# Patient Record
Sex: FEMALE | Race: BLACK OR AFRICAN AMERICAN | NOT HISPANIC OR LATINO | URBAN - METROPOLITAN AREA
[De-identification: names, ages, dates, MRNs, and addresses within clinical notes are randomized per-mention and may not be internally consistent; named-entity substitution may affect disease eponyms.]

---

## 2016-12-30 NOTE — PATIENT PROFILE ADULT. - LIVES WITH, PROFILE
(2 children, brothers, and mother), private home, 4 steps-to-enter/exit home w/o handrail, bedroom/bathroom on first floor of home/children/other relative/parents

## 2016-12-30 NOTE — H&P ADULT. - ASSESSMENT
26-year-old female with Mobitz I, reported prior history of 2 recent episodes of pericarditis (both at [facility]) presenting with 2days of nausea/vomiting and abdominal pain.

## 2016-12-30 NOTE — H&P ADULT. - PROBLEM SELECTOR PLAN 4
unclear etiology of pericarditis  obtain records from [place]  Lyme titer from 12/12/16 negative, however started on CTX by ED for "lyme disease" ?presumed endocarditis   patient with only 1/2 IgM and 2/5 IgG not consistent with positive Lyme titers as stated in EMR unclear etiology of pericarditis  obtain records from [place]  Lyme titer from 12/12/16 negative, however started on CTX by ED for "lyme disease" ?presumed endocarditis   patient with only 1/2 IgM and 2/5 IgG not consistent with positive Lyme titers, Western blot the more reliable test for Lyme testing, patient likely does not have Lyme exposure, will d/c antibiotics.

## 2016-12-30 NOTE — ED PROVIDER NOTE - ATTENDING CONTRIBUTION TO CARE
agree with resident note  26 yr old female with recent visit to ED/CDU for chest pain and noted to have pericarditis and well in CDU had second degree type I.  Lyme titers were sent and have returned positive.  Today she presents with mild abdominal pain with associated V/D.  Denies sick contacts.  Given titer returned positive recently has not been on abx.  Another important complaint for her as well as diffuse myalgias and arthralgias.    PE:  uncomfortable, VSS, CTAB/L; s1 s2 no m/r/g abd soft/NT/ND ext: no edema skin: no evidence of tick

## 2016-12-30 NOTE — ED ADULT NURSE NOTE - OBJECTIVE STATEMENT
Patient received in room #27 c/o N/V/D starting last night. A&Ox3, ambulatory. Patient reports she has been vomiting "all day", diarrhea x1 today. Patient c/o lower abdominal pain. Denies any chest pain or difficulty breathing. Patient reports last menstrual period ended 12/14/16. Patient denies any pain or difficulty urinating. VS as noted. 20G IV Placed in right ac, labs drawn and sent. Will monitor.

## 2016-12-30 NOTE — ED PROVIDER NOTE - MEDICAL DECISION MAKING DETAILS
Pt 25 yo F PMHx of anemia and Pericarditis who p/w n/v and one episode of diarrhea concerning for gastroenteritis but will r/o pregnancy and pancreatitis. Will order CBC, CMP, Lipase, UA, UCx, VBG w/ lactate, IVFs, Morphine, zofran, Urine preg and Serum Beta-hcg.

## 2016-12-30 NOTE — ED PROVIDER NOTE - PROGRESS NOTE DETAILS
patient found to have lyme disease w/ pericarditis and 2nd degree Mobitz on last CDU stay will need admission for disseminated lyme disease. No abdominal CT scans needed at this time for gastroenteritis. Spoke to hospitalist and will admit on Tele to Medicine. Text Paged MAR.

## 2016-12-30 NOTE — H&P ADULT. - PROBLEM SELECTOR PLAN 1
Patient between 1st and 2nd degree type 1 heart block, unclear etiology, possibly related to recent pericarditis Patient between 1st and 2nd degree type 1 heart block, unclear etiology, possibly related to recent pericarditis  monitor on tele  call cards consult given recent syncopal episode however more likely a result of vagal stimulation from repetitive vomiting and decreased PO intake Patient between 1st and 2nd degree type 1 heart block, unclear etiology, possibly related to recent pericarditis however no signs of pericarditis at present  monitor on tele  call cards consult given recent syncopal episode however more likely a result of vagal stimulation from repetitive vomiting and decreased PO intake  echo from 12/11/16 without signs of pericardial effusion or pericarditis, nml EF 66%  given injected L sclera, will check inflammatory markers, ESR/CRP  no significant hilar adenopathy on CXR to suggest sarcoid, however in differential  may benefit from rheum eval if workup negative (will check UA to look for protein in urine, may suggest vasculitic/systemic involvement)

## 2016-12-30 NOTE — H&P ADULT. - LAB RESULTS AND INTERPRETATION
Labs personally reviewed  CMP/CBC wnl, negative hCG, lipase wnl  Lyme titers from 12/11/16 reviewed - negative (only one out 2 needed IgM positive (41KD) and only 2 out of needed 5 IgG positive) however noted heart block while in CDU on prior ED stay Labs personally reviewed  CMP/CBC wnl, negative hCG, lipase wnl  Lyme titers from 12/11/16 reviewed - negative (only one out 2 needed IgM positive (41KD) and only 2 out of needed 5 IgG positive) however noted heart block while in CDU on prior ED stay  CK/troponin (-)

## 2016-12-30 NOTE — ED PROVIDER NOTE - OBJECTIVE STATEMENT
Pt 25 yo F h/o pericarditis and anemia who pw severe abdominal pain that started yesterday and night then this morning she started having severe vomiting. Pt states she had blood in her vomit as well that was streaky the first time she vomited. Pt had one episode of diarrhea when it started but none since. Pt describes pain as burning, non radiating, diffuse abdominal pain. Nothing has made it better and she continues to vomit. Pt denies sick contacts, travel, LMP 12/14/16, and no recent abx use.

## 2016-12-30 NOTE — H&P ADULT. - HISTORY OF PRESENT ILLNESS
26-year-old female with history of Mobitz I,     In the ED VS: 97.9  74  114/79  12  100%RA, received Ceftriaxone 2g IV x1, Morphine 4mg IV x1, Zofran 4mg IV x1, 2L NS. 26-year-old female with history of Mobitz I (seen on prior CDU visit 12/11/16) presenting with     In the ED VS: 97.9  74  114/79  12  100%RA, received Ceftriaxone 2g IV x1, Morphine 4mg IV x1, Zofran 4mg IV x1, 2L NS. 26-year-old female with history of Mobitz I (seen on prior CDU visit 12/11/16) presenting with nausea/vomiting/abdominal pain x2 days.  She reports at least 4 episodes of vomiting/day.  Noted blood in first episode of vomitus 2 days ago, has not noted any blood since that time.  She has associated chills, myalgias, headache and anorexia. Noted decreased frequency of urination.  She denies sick contacts, travel, recent antibiotic use, fevers, chest pain, palpitations, cough, shortness of breath, rhinorrhea, sinus symptoms, dysuria, hematuria. She reports recently going to [facility] 10/2016 for pericarditis, was discharged home with motrin, unclear etiology of pericarditis.  Patient also presented to [facility] ED 12/11/16 and monitored in CDU noted to have 2nd degree type 1 and also noted type 2 in CDU note however no EKG evidence of Mobitz 2.  Patient has no personal or family history of autoimmune diseases including sarcoid, amyloid.  Patient reports "blacking out" at home yesterday onto her bed, with no head trauma. No recent camping trips, lives in [place], no known tick bites.  Reports one of her dogs at home had fleas and they were biting her repeatedly prior to her first episode of pericarditis in 10/2016.    In the ED VS: 97.9  74  114/79  12  100%RA, received Ceftriaxone 2g IV x1, Morphine 4mg IV x1, Zofran 4mg IV x1, 2L NS. 26-year-old female with history of Mobitz I (seen on prior CDU visit 12/11/16) presenting with nausea/vomiting/abdominal pain x2 days.  She reports at least 4 episodes of vomiting/day.  Noted blood in first episode of vomitus 2 days ago, has not noted any blood since that time.  She has associated chills, myalgias, headache and anorexia. Noted decreased frequency of urination.  She denies sick contacts, travel, recent antibiotic use, rashes/skin changes, fevers, chest pain, palpitations, cough, shortness of breath, rhinorrhea, sinus symptoms, dysuria, hematuria. She reports recently going to [facility] 10/2016 for pericarditis, was discharged home with motrin, unclear etiology of pericarditis.  Patient also presented to [facility] ED 12/11/16 and monitored in CDU noted to have 2nd degree type 1 and also noted type 2 in CDU note however no EKG evidence of Mobitz 2.  Patient has no personal or family history of autoimmune diseases including sarcoid, amyloid.  Patient reports "blacking out" at home yesterday onto her bed, with no head trauma. No recent camping trips, lives in [place], no known tick bites.  Reports one of her dogs at home had fleas and they were biting her repeatedly prior to her first episode of pericarditis in 10/2016.    Patient reports monogamous sexual contact with boyfriend, no history of STI.     In the ED VS: 97.9  74  114/79  12  100%RA, received Ceftriaxone 2g IV x1, Morphine 4mg IV x1, Zofran 4mg IV x1, 2L NS.

## 2016-12-30 NOTE — H&P ADULT. - RS GEN PE MLT RESP DETAILS PC
breath sounds equal/clear to auscultation bilaterally/no rhonchi/no wheezes/no intercostal retractions/normal/respirations non-labored/airway patent/good air movement/no rales

## 2016-12-30 NOTE — H&P ADULT. - PROBLEM SELECTOR PLAN 3
Patient with second degree type I AV block and seemingly asymptomatic however with the vomiting - possible syncope in response to, in a sense, a vagal maneuver, will treat with anti-emetics, IVF, monitor on tele, check orthostatics

## 2016-12-30 NOTE — H&P ADULT. - PROBLEM SELECTOR PLAN 2
anti-emetics for nausea, IVF while not tolerating PO  lipase wnl  avoid NSAIDs for now in setting of report of small amt blood noted in vomitus 2days ago x1 (with first episode of vomiting)  serial abdominal exams   abd pain diffuse, likely musculoskeletal in origin given recurrent vomiting   likely viral etiology anti-emetics for nausea, IVF while not tolerating PO  lipase wnl  avoid NSAIDs for now in setting of report of small amt blood noted in vomitus 2days ago x1 (with first episode of vomiting)  serial abdominal exams, if persistent consider CT abd for further eval  abd pain diffuse, likely musculoskeletal in origin given recurrent vomiting   likely viral etiology

## 2016-12-31 NOTE — DISCHARGE NOTE ADULT - PLAN OF CARE
Continue with supportive care, hydration and a healthy diet Follow up with your PCP within 2 weeks of discharge You continued to have heart block during your hospitalization Cardiology and EP doctors evaluated you and recommended____________ There was no evidence to suggest acute Pericarditis during this hospitalization It is likely that your passed out (syncope) after vomiting and retching at home Continue with supportive care. If you feel tired, faint, or weak, try sitting down to protect from falls. If this continues to happen speak with your doctor - this may be due to your history of AV block (heart block) You had a negative titer for Lyme disease Cardiology and EP doctors evaluated you and recommended no acute interventions at this time. You should follow up as an outpatient with an EP doctor. The phone number to schedule an appointment is [phone].

## 2016-12-31 NOTE — DISCHARGE NOTE ADULT - SECONDARY DIAGNOSIS.
Mobitz (type) I (Wenckebach's) atrioventricular block History of pericarditis Syncope and collapse Lyme disease

## 2016-12-31 NOTE — DISCHARGE NOTE ADULT - PATIENT PORTAL LINK FT
“You can access the [facility] Patient Portal, offered by [facility], by registering with the following website: [email or web address]”

## 2016-12-31 NOTE — DISCHARGE NOTE ADULT - HOSPITAL COURSE
26-year-old female with history of Mobitz I (seen on prior CDU visit 12/11/16) presenting with nausea/vomiting/abdominal pain x2 days.  She reports at least 4 episodes of vomiting/day.  Noted blood in first episode of vomitus 2 days ago, has not noted any blood since that time.  She has associated chills, myalgias, headache and anorexia. Noted decreased frequency of urination.  She denies sick contacts, travel, recent antibiotic use, rashes/skin changes, fevers, chest pain, palpitations, cough, shortness of breath, rhinorrhea, sinus symptoms, dysuria, hematuria. She reports recently going to [facility] 10/2016 for pericarditis, was discharged home with motrin, unclear etiology of pericarditis.  Patient also presented to [facility] ED 12/11/16 and monitored in CDU noted to have 2nd degree type 1 and also noted type 2 in CDU note however no EKG evidence of Mobitz 2.  Patient has no personal or family history of autoimmune diseases including sarcoid, amyloid.  Patient reports "blacking out" at home yesterday onto her bed, with no head trauma. No recent camping trips, lives in [place], no known tick bites.  Reports one of her dogs at home had fleas and they were biting her repeatedly prior to her first episode of pericarditis in 10/2016. Patient reports monogamous sexual contact with boyfriend, no history of STI.     In the ED VS: 97.9  74  114/79  12  100%RA, received Ceftriaxone 2g IV x1, Morphine 4mg IV x1, Zofran 4mg IV x1, 2L NS    Hospital Course: Patient was monitored on telemetry, cardiology was consulted and EP was consulted. Cardiology and EP recommended___________________________________________ Patient's lab work was stable with no sign of leukocytosis, infection, kidney damage, or dehydration. Due to suspicion of Lyme, ID was consulted - they recommended _____________________________. Patient telemetry showed consistent evidence of Mobitz I heart block. Patient remained asymptomatic, with stable vital signs. Patient was initially monitored on a clear liquid diet, however, upon tolerating an advanced diet was stable for close outpatient follow up. 26-year-old female with history of Mobitz I (seen on prior CDU visit 12/11/16) presenting with nausea/vomiting/abdominal pain x2 days.  She reports at least 4 episodes of vomiting/day.  Noted blood in first episode of vomitus 2 days ago, has not noted any blood since that time.  She has associated chills, myalgias, headache and anorexia. Noted decreased frequency of urination.  She denies sick contacts, travel, recent antibiotic use, rashes/skin changes, fevers, chest pain, palpitations, cough, shortness of breath, rhinorrhea, sinus symptoms, dysuria, hematuria. She reports recently going to [facility] 10/2016 for pericarditis, was discharged home with motrin, unclear etiology of pericarditis.  Patient also presented to [facility] ED 12/11/16 and monitored in CDU noted to have 2nd degree type 1 and also noted type 2 in CDU note however no EKG evidence of Mobitz 2.  Patient has no personal or family history of autoimmune diseases including sarcoid, amyloid.  Patient reports "blacking out" at home yesterday onto her bed, with no head trauma. No recent camping trips, lives in [place], no known tick bites.  Reports one of her dogs at home had fleas and they were biting her repeatedly prior to her first episode of pericarditis in 10/2016. Patient reports monogamous sexual contact with boyfriend, no history of STI.     In the ED VS: 97.9  74  114/79  12  100%RA, received Ceftriaxone 2g IV x1, Morphine 4mg IV x1, Zofran 4mg IV x1, 2L NS    Hospital Course: Patient was monitored on telemetry, cardiology was consulted and EP was consulted. Cardiology and EP recommended no acute intervention Patient's lab work was stable with no sign of leukocytosis, infection, kidney damage, or dehydration. Due to suspicion of Lyme, ID was consulted - they recommended no antibiotics, titers were negative. Patient telemetry showed consistent evidence of Mobitz I heart block. Patient remained asymptomatic, with stable vital signs. Patient was initially monitored on a clear liquid diet, however, upon tolerating an advanced diet was stable for close outpatient follow up. 26-year-old female with history of Mobitz I (seen on prior CDU visit 12/11/16) presenting with nausea/vomiting/abdominal pain x2 days.  She reports at least 4 episodes of vomiting/day.  Noted blood in first episode of vomitus 2 days ago, has not noted any blood since that time.  She has associated chills, myalgias, headache and anorexia. Noted decreased frequency of urination.  She denies sick contacts, travel, recent antibiotic use, rashes/skin changes, fevers, chest pain, palpitations, cough, shortness of breath, rhinorrhea, sinus symptoms, dysuria, hematuria. She reports recently going to [facility] 10/2016 for pericarditis, was discharged home with motrin, unclear etiology of pericarditis.  Patient also presented to [facility] ED 12/11/16 and monitored in CDU noted to have 2nd degree type 1 and also noted type 2 in CDU note however no EKG evidence of Mobitz 2.  Patient has no personal or family history of autoimmune diseases including sarcoid, amyloid.  Patient reports "blacking out" at home yesterday onto her bed, with no head trauma. No recent camping trips, lives in [place], no known tick bites.  Reports one of her dogs at home had fleas and they were biting her repeatedly prior to her first episode of pericarditis in 10/2016. Patient reports monogamous sexual contact with boyfriend, no history of STI.     In the ED VS: 97.9  74  114/79  12  100%RA, received Ceftriaxone 2g IV x1, Morphine 4mg IV x1, Zofran 4mg IV x1, 2L NS    Hospital Course: Patient was monitored on telemetry, cardiology was consulted and EP was consulted. Cardiology and EP recommended no acute intervention Patient's lab work was stable with no sign of leukocytosis, infection, kidney damage, or dehydration. Due to suspicion of Lyme, ID was consulted - they recommended no antibiotics, titers were negative. Patient telemetry showed consistent evidence of Mobitz I heart block. Patient remained asymptomatic, with stable vital signs. Patient was initially monitored on a clear liquid diet, however, upon tolerating an advanced diet was stable for close outpatient follow up. She is to follow up with EP as an outpatient and return to the ED should she have any further chest pain, SOB, palpitations, dizziness, LH, or syncope.

## 2016-12-31 NOTE — DISCHARGE NOTE ADULT - CARE PLAN
Principal Discharge DX:	Gastroenteritis  Goal:	Continue with supportive care, hydration and a healthy diet  Instructions for follow-up, activity and diet:	Follow up with your PCP within 2 weeks of discharge  Secondary Diagnosis:	Mobitz (type) I (Wenckebach's) atrioventricular block  Goal:	You continued to have heart block during your hospitalization  Instructions for follow-up, activity and diet:	Cardiology and EP doctors evaluated you and recommended____________  Secondary Diagnosis:	History of pericarditis  Goal:	There was no evidence to suggest acute Pericarditis during this hospitalization  Secondary Diagnosis:	Syncope and collapse  Goal:	It is likely that your passed out (syncope) after vomiting and retching at home  Instructions for follow-up, activity and diet:	Continue with supportive care. If you feel tired, faint, or weak, try sitting down to protect from falls. If this continues to happen speak with your doctor - this may be due to your history of AV block (heart block)  Secondary Diagnosis:	Lyme disease Principal Discharge DX:	Gastroenteritis  Goal:	Continue with supportive care, hydration and a healthy diet  Instructions for follow-up, activity and diet:	Follow up with your PCP within 2 weeks of discharge  Secondary Diagnosis:	Mobitz (type) I (Wenckebach's) atrioventricular block  Goal:	You continued to have heart block during your hospitalization  Instructions for follow-up, activity and diet:	Cardiology and EP doctors evaluated you and recommended no acute interventions at this time. You should follow up as an outpatient with an EP doctor. The phone number to schedule an appointment is [phone].  Secondary Diagnosis:	History of pericarditis  Goal:	There was no evidence to suggest acute Pericarditis during this hospitalization  Secondary Diagnosis:	Syncope and collapse  Goal:	It is likely that your passed out (syncope) after vomiting and retching at home  Instructions for follow-up, activity and diet:	Continue with supportive care. If you feel tired, faint, or weak, try sitting down to protect from falls. If this continues to happen speak with your doctor - this may be due to your history of AV block (heart block)  Secondary Diagnosis:	Lyme disease  Goal:	You had a negative titer for Lyme disease Principal Discharge DX:	Gastroenteritis  Goal:	Continue with supportive care, hydration and a healthy diet  Instructions for follow-up, activity and diet:	Follow up with your PCP within 2 weeks of discharge  Secondary Diagnosis:	Mobitz (type) I (Wenckebach's) atrioventricular block  Goal:	You continued to have heart block during your hospitalization  Instructions for follow-up, activity and diet:	Cardiology and EP doctors evaluated you and recommended no acute interventions at this time. You should follow up as an outpatient with an EP doctor. The phone number to schedule an appointment is [phone].  Secondary Diagnosis:	History of pericarditis  Goal:	There was no evidence to suggest acute Pericarditis during this hospitalization  Secondary Diagnosis:	Syncope and collapse  Goal:	It is likely that your passed out (syncope) after vomiting and retching at home  Instructions for follow-up, activity and diet:	Continue with supportive care. If you feel tired, faint, or weak, try sitting down to protect from falls. If this continues to happen speak with your doctor - this may be due to your history of AV block (heart block)  Secondary Diagnosis:	Lyme disease  Goal:	You had a negative titer for Lyme disease Principal Discharge DX:	Gastroenteritis  Goal:	Continue with supportive care, hydration and a healthy diet  Instructions for follow-up, activity and diet:	Follow up with your PCP within 2 weeks of discharge  Secondary Diagnosis:	Mobitz (type) I (Wenckebach's) atrioventricular block  Goal:	You continued to have heart block during your hospitalization  Instructions for follow-up, activity and diet:	Cardiology and EP doctors evaluated you and recommended no acute interventions at this time. You should follow up as an outpatient with an EP doctor. The phone number to schedule an appointment is [phone].  Secondary Diagnosis:	History of pericarditis  Goal:	There was no evidence to suggest acute Pericarditis during this hospitalization  Secondary Diagnosis:	Syncope and collapse  Goal:	It is likely that your passed out (syncope) after vomiting and retching at home  Instructions for follow-up, activity and diet:	Continue with supportive care. If you feel tired, faint, or weak, try sitting down to protect from falls. If this continues to happen speak with your doctor - this may be due to your history of AV block (heart block)  Secondary Diagnosis:	Lyme disease  Goal:	You had a negative titer for Lyme disease Principal Discharge DX:	Gastroenteritis  Goal:	Continue with supportive care, hydration and a healthy diet  Instructions for follow-up, activity and diet:	Follow up with your PCP within 2 weeks of discharge  Secondary Diagnosis:	Mobitz (type) I (Wenckebach's) atrioventricular block  Goal:	You continued to have heart block during your hospitalization  Instructions for follow-up, activity and diet:	Cardiology and EP doctors evaluated you and recommended no acute interventions at this time. You should follow up as an outpatient with an EP doctor. The phone number to schedule an appointment is [phone].  Secondary Diagnosis:	History of pericarditis  Goal:	There was no evidence to suggest acute Pericarditis during this hospitalization  Secondary Diagnosis:	Syncope and collapse  Goal:	It is likely that your passed out (syncope) after vomiting and retching at home  Instructions for follow-up, activity and diet:	Continue with supportive care. If you feel tired, faint, or weak, try sitting down to protect from falls. If this continues to happen speak with your doctor - this may be due to your history of AV block (heart block)  Secondary Diagnosis:	Lyme disease  Goal:	You had a negative titer for Lyme disease Principal Discharge DX:	Gastroenteritis  Goal:	Continue with supportive care, hydration and a healthy diet  Instructions for follow-up, activity and diet:	Follow up with your PCP within 2 weeks of discharge  Secondary Diagnosis:	Mobitz (type) I (Wenckebach's) atrioventricular block  Goal:	You continued to have heart block during your hospitalization  Instructions for follow-up, activity and diet:	Cardiology and EP doctors evaluated you and recommended no acute interventions at this time. You should follow up as an outpatient with an EP doctor. The phone number to schedule an appointment is [phone].  Secondary Diagnosis:	History of pericarditis  Goal:	There was no evidence to suggest acute Pericarditis during this hospitalization  Secondary Diagnosis:	Syncope and collapse  Goal:	It is likely that your passed out (syncope) after vomiting and retching at home  Instructions for follow-up, activity and diet:	Continue with supportive care. If you feel tired, faint, or weak, try sitting down to protect from falls. If this continues to happen speak with your doctor - this may be due to your history of AV block (heart block)  Secondary Diagnosis:	Lyme disease  Goal:	You had a negative titer for Lyme disease

## 2017-01-02 NOTE — PHYSICAL THERAPY INITIAL EVALUATION ADULT - CRITERIA FOR SKILLED THERAPEUTIC INTERVENTIONS
anticipated discharge recommendation/impairments found/therapy frequency/predicted duration of therapy intervention/Outpatient PT

## 2017-01-02 NOTE — PHYSICAL THERAPY INITIAL EVALUATION ADULT - RANGE OF MOTION EXAMINATION, REHAB EVAL
bilateral upper extremity ROM was WFL (within functional limits)/bilateral lower extremity ROM was WFL (within functional limits)/(not formally assessed)

## 2017-01-02 NOTE — PHYSICAL THERAPY INITIAL EVALUATION ADULT - ADDITIONAL COMMENTS
Pt. left sitting @ EOB post-PT in NAD w/ all lines/tubes intact & table/phone/call bell within reach.  RN Jarocho aware.

## 2017-01-02 NOTE — PHYSICAL THERAPY INITIAL EVALUATION ADULT - LIVES WITH, PROFILE
children/(2 children, brothers, and mother), private home, 4 steps-to-enter/exit home w/o handrail, bedroom/bathroom on first floor of home/other relative/parents

## 2017-01-02 NOTE — PHYSICAL THERAPY INITIAL EVALUATION ADULT - PERTINENT HX OF CURRENT PROBLEM, REHAB EVAL
Pt. is a 27 y/o female adm. to [facility] on 12/30/16 w/ a dx of Lyme Disease, nausea, vomiting, and abdominal pain.  PT consult request 2/2 feels unstable on feet.  H/O pericarditis.  (-) CXR.  CE (-) x 1.

## 2017-01-12 ENCOUNTER — EMERGENCY (EMERGENCY)
Facility: HOSPITAL | Age: 27
LOS: 1 days | Discharge: HOME | End: 2017-01-12
Attending: EMERGENCY MEDICINE | Admitting: EMERGENCY MEDICINE
Payer: MEDICAID

## 2017-01-12 VITALS
TEMPERATURE: 98 F | RESPIRATION RATE: 18 BRPM | HEART RATE: 80 BPM | SYSTOLIC BLOOD PRESSURE: 111 MMHG | DIASTOLIC BLOOD PRESSURE: 68 MMHG | OXYGEN SATURATION: 100 %

## 2017-01-12 VITALS
RESPIRATION RATE: 15 BRPM | DIASTOLIC BLOOD PRESSURE: 90 MMHG | SYSTOLIC BLOOD PRESSURE: 134 MMHG | OXYGEN SATURATION: 100 % | HEART RATE: 68 BPM | TEMPERATURE: 98 F

## 2017-01-12 DIAGNOSIS — Z98.891 HISTORY OF UTERINE SCAR FROM PREVIOUS SURGERY: Chronic | ICD-10-CM

## 2017-01-12 LAB
ALBUMIN SERPL ELPH-MCNC: 3.7 G/DL — SIGNIFICANT CHANGE UP (ref 3.3–5)
ALP SERPL-CCNC: 65 U/L — SIGNIFICANT CHANGE UP (ref 40–120)
ALT FLD-CCNC: 30 U/L — SIGNIFICANT CHANGE UP (ref 4–33)
AST SERPL-CCNC: 24 U/L — SIGNIFICANT CHANGE UP (ref 4–32)
BASOPHILS # BLD AUTO: 0.03 K/UL — SIGNIFICANT CHANGE UP (ref 0–0.2)
BASOPHILS NFR BLD AUTO: 0.6 % — SIGNIFICANT CHANGE UP (ref 0–2)
BILIRUB SERPL-MCNC: 0.2 MG/DL — SIGNIFICANT CHANGE UP (ref 0.2–1.2)
BUN SERPL-MCNC: 12 MG/DL — SIGNIFICANT CHANGE UP (ref 7–23)
CALCIUM SERPL-MCNC: 9.3 MG/DL — SIGNIFICANT CHANGE UP (ref 8.4–10.5)
CHLORIDE SERPL-SCNC: 102 MMOL/L — SIGNIFICANT CHANGE UP (ref 98–107)
CO2 SERPL-SCNC: 24 MMOL/L — SIGNIFICANT CHANGE UP (ref 22–31)
CREAT SERPL-MCNC: 0.87 MG/DL — SIGNIFICANT CHANGE UP (ref 0.5–1.3)
D DIMER BLD IA.RAPID-MCNC: 172 NG/ML — SIGNIFICANT CHANGE UP
EOSINOPHIL # BLD AUTO: 0.07 K/UL — SIGNIFICANT CHANGE UP (ref 0–0.5)
EOSINOPHIL NFR BLD AUTO: 1.4 % — SIGNIFICANT CHANGE UP (ref 0–6)
GLUCOSE SERPL-MCNC: 77 MG/DL — SIGNIFICANT CHANGE UP (ref 70–99)
HCG SERPL-ACNC: < 5 MIU/ML — SIGNIFICANT CHANGE UP
HCT VFR BLD CALC: 35.3 % — SIGNIFICANT CHANGE UP (ref 34.5–45)
HGB BLD-MCNC: 11.1 G/DL — LOW (ref 11.5–15.5)
IMM GRANULOCYTES NFR BLD AUTO: 0.2 % — SIGNIFICANT CHANGE UP (ref 0–1.5)
LYMPHOCYTES # BLD AUTO: 2.4 K/UL — SIGNIFICANT CHANGE UP (ref 1–3.3)
LYMPHOCYTES # BLD AUTO: 48.1 % — HIGH (ref 13–44)
MCHC RBC-ENTMCNC: 27.8 PG — SIGNIFICANT CHANGE UP (ref 27–34)
MCHC RBC-ENTMCNC: 31.4 % — LOW (ref 32–36)
MCV RBC AUTO: 88.5 FL — SIGNIFICANT CHANGE UP (ref 80–100)
MONOCYTES # BLD AUTO: 0.38 K/UL — SIGNIFICANT CHANGE UP (ref 0–0.9)
MONOCYTES NFR BLD AUTO: 7.6 % — SIGNIFICANT CHANGE UP (ref 2–14)
NEUTROPHILS # BLD AUTO: 2.1 K/UL — SIGNIFICANT CHANGE UP (ref 1.8–7.4)
NEUTROPHILS NFR BLD AUTO: 42.1 % — LOW (ref 43–77)
PLATELET # BLD AUTO: 241 K/UL — SIGNIFICANT CHANGE UP (ref 150–400)
PMV BLD: 10.8 FL — SIGNIFICANT CHANGE UP (ref 7–13)
POTASSIUM SERPL-MCNC: 4.1 MMOL/L — SIGNIFICANT CHANGE UP (ref 3.5–5.3)
POTASSIUM SERPL-SCNC: 4.1 MMOL/L — SIGNIFICANT CHANGE UP (ref 3.5–5.3)
PROT SERPL-MCNC: 6.9 G/DL — SIGNIFICANT CHANGE UP (ref 6–8.3)
RBC # BLD: 3.99 M/UL — SIGNIFICANT CHANGE UP (ref 3.8–5.2)
RBC # FLD: 13.5 % — SIGNIFICANT CHANGE UP (ref 10.3–14.5)
SODIUM SERPL-SCNC: 139 MMOL/L — SIGNIFICANT CHANGE UP (ref 135–145)
TROPONIN T SERPL-MCNC: < 0.06 NG/ML — SIGNIFICANT CHANGE UP (ref 0–0.06)
TROPONIN T SERPL-MCNC: < 0.06 NG/ML — SIGNIFICANT CHANGE UP (ref 0–0.06)
WBC # BLD: 4.99 K/UL — SIGNIFICANT CHANGE UP (ref 3.8–10.5)
WBC # FLD AUTO: 4.99 K/UL — SIGNIFICANT CHANGE UP (ref 3.8–10.5)

## 2017-01-12 PROCEDURE — 71020: CPT | Mod: 26

## 2017-01-12 PROCEDURE — 93010 ELECTROCARDIOGRAM REPORT: CPT

## 2017-01-12 PROCEDURE — 99285 EMERGENCY DEPT VISIT HI MDM: CPT | Mod: 25

## 2017-01-12 RX ORDER — FAMOTIDINE 10 MG/ML
1 INJECTION INTRAVENOUS
Qty: 30 | Refills: 0 | OUTPATIENT
Start: 2017-01-12 | End: 2017-02-11

## 2017-01-12 RX ORDER — IBUPROFEN 200 MG
1 TABLET ORAL
Qty: 42 | Refills: 0 | OUTPATIENT
Start: 2017-01-12 | End: 2017-01-26

## 2017-01-12 RX ORDER — SODIUM CHLORIDE 9 MG/ML
1000 INJECTION INTRAMUSCULAR; INTRAVENOUS; SUBCUTANEOUS ONCE
Qty: 0 | Refills: 0 | Status: COMPLETED | OUTPATIENT
Start: 2017-01-12 | End: 2017-01-12

## 2017-01-12 RX ORDER — ACETAMINOPHEN 500 MG
650 TABLET ORAL ONCE
Qty: 0 | Refills: 0 | Status: COMPLETED | OUTPATIENT
Start: 2017-01-12 | End: 2017-01-12

## 2017-01-12 RX ORDER — IPRATROPIUM/ALBUTEROL SULFATE 18-103MCG
3 AEROSOL WITH ADAPTER (GRAM) INHALATION ONCE
Qty: 0 | Refills: 0 | Status: COMPLETED | OUTPATIENT
Start: 2017-01-12 | End: 2017-01-12

## 2017-01-12 RX ORDER — IBUPROFEN 200 MG
400 TABLET ORAL ONCE
Qty: 0 | Refills: 0 | Status: DISCONTINUED | OUTPATIENT
Start: 2017-01-12 | End: 2017-01-12

## 2017-01-12 RX ORDER — IBUPROFEN 200 MG
600 TABLET ORAL ONCE
Qty: 0 | Refills: 0 | Status: COMPLETED | OUTPATIENT
Start: 2017-01-12 | End: 2017-01-12

## 2017-01-12 RX ADMIN — SODIUM CHLORIDE 1000 MILLILITER(S): 9 INJECTION INTRAMUSCULAR; INTRAVENOUS; SUBCUTANEOUS at 18:31

## 2017-01-12 RX ADMIN — Medication 600 MILLIGRAM(S): at 18:32

## 2017-01-12 RX ADMIN — Medication 650 MILLIGRAM(S): at 19:28

## 2017-01-12 RX ADMIN — Medication 3 MILLILITER(S): at 18:32

## 2017-01-12 RX ADMIN — Medication 600 MILLIGRAM(S): at 19:11

## 2017-01-12 NOTE — ED PROVIDER NOTE - ATTENDING CONTRIBUTION TO CARE
I was physically present for the E/M service provided. I agree with above history, physical, and plan which I have reviewed and edited where appropriate. I was physically present for the key portions of the service provided.    25 y/o female with PMH of pericarditis and 1st degree AV block p/w CP, non-pleuritic, non-exertional, improved leaning forward  -EKG 1st degree block without ischemia  -Troponin WNL x2  -CXR clear  -No pericardial effusion on US  -?pericarditis, NSAIDs and PCP + cards f/u

## 2017-01-12 NOTE — ED ADULT NURSE NOTE - OBJECTIVE STATEMENT
stabbing in nature, constant x 2 days, without shortness of breath and dizziness.  Pt recently d/xed pericarditis couple months ago.   Seen by MD Velázquez.  IV accessed.  labs sent.    EKG - NSR.  Cardiac monitoring ongoing.  Medication given as ordered.   Pending results and further disposition.

## 2017-01-12 NOTE — ED PROVIDER NOTE - PLAN OF CARE
-- Please follow up with a cardiologist within a week. Call [phone] to schedule an appointment ASAP. Most patients can be seen within 48 hours. Mention that you were just discharged from the emergency room and need to be seen immediately.   -- See referral list to make follow up appointment with rheumatology in 5-7 days.  -- Take ibuprofen as instructed. Your prescription is waiting for you at [facility], [address] [facility] in [place]. Take ibuprofen with meals. If you experience stomach pain or other adverse effect after taking ibuprofen, stop taking it and discuss further with your doctor.  -- Return to ER immediately for new or worsening symptoms or for any concerns. -- Please follow up with a cardiologist within a week. Call [phone] to schedule an appointment ASAP. Most patients can be seen within 48 hours. Mention that you were just discharged from the emergency room and need to be seen immediately.   -- See referral list to make follow up appointment with rheumatology in 5-7 days.  -- Take ibuprofen and pepcid as instructed. Your prescription is waiting for you at [facility], [address] [place] [address] in [place]. Take ibuprofen with meals. If you experience stomach pain or other adverse effect after taking ibuprofen, stop taking it and discuss further with your doctor.  -- Return to ER immediately for new or worsening symptoms or for any concerns.

## 2017-01-12 NOTE — ED PROVIDER NOTE - OBJECTIVE STATEMENT
27yo female h/o pericarditis 1st degree AV block, recent admission for ?disseminated Lyme (Per ID not lyme), 1st degree vs 2nd degree AV block, seen by cards and no intervention recommended, p/w sharp substernal chest pain worsening over the last few days associated with SOB, fatigue, 27yo female h/o pericarditis 1st degree AV block, recent admission for ?disseminated Lyme (Per ID not lyme), 1st degree vs 2nd degree AV block, seen by cards and no intervention recommended, (normal echo) p/w sharp substernal chest pain worsening over the last few days associated with SOB, fatigue, joint pains. No fevers, chills, URI sx, cough Pt says for last few weeks, decreased PO, no energy to get out of bed

## 2017-01-12 NOTE — ED PROVIDER NOTE - CARE PLAN
Principal Discharge DX:	Chest pain  Instructions for follow-up, activity and diet:	-- Please follow up with a cardiologist within a week. Call [phone] to schedule an appointment ASAP. Most patients can be seen within 48 hours. Mention that you were just discharged from the emergency room and need to be seen immediately.   -- See referral list to make follow up appointment with rheumatology in 5-7 days.  -- Take ibuprofen as instructed. Your prescription is waiting for you at [facility], [address] [facility] in [place]. Take ibuprofen with meals. If you experience stomach pain or other adverse effect after taking ibuprofen, stop taking it and discuss further with your doctor.  -- Return to ER immediately for new or worsening symptoms or for any concerns. Principal Discharge DX:	Chest pain  Instructions for follow-up, activity and diet:	-- Please follow up with a cardiologist within a week. Call [phone] to schedule an appointment ASAP. Most patients can be seen within 48 hours. Mention that you were just discharged from the emergency room and need to be seen immediately.   -- See referral list to make follow up appointment with rheumatology in 5-7 days.  -- Take ibuprofen and pepcid as instructed. Your prescription is waiting for you at [facility], [address] [facility] in [place]. Take ibuprofen with meals. If you experience stomach pain or other adverse effect after taking ibuprofen, stop taking it and discuss further with your doctor.  -- Return to ER immediately for new or worsening symptoms or for any concerns. Principal Discharge DX:	Chest pain  Instructions for follow-up, activity and diet:	-- Please follow up with a cardiologist within a week. Call [phone] to schedule an appointment ASAP. Most patients can be seen within 48 hours. Mention that you were just discharged from the emergency room and need to be seen immediately.   -- See referral list to make follow up appointment with rheumatology in 5-7 days.  -- Take ibuprofen and pepcid as instructed. Your prescription is waiting for you at [facility], [address] [address] [facility] in [place]. Take ibuprofen with meals. If you experience stomach pain or other adverse effect after taking ibuprofen, stop taking it and discuss further with your doctor.  -- Return to ER immediately for new or worsening symptoms or for any concerns. Principal Discharge DX:	Chest pain  Instructions for follow-up, activity and diet:	-- Please follow up with a cardiologist within a week. Call [phone] to schedule an appointment ASAP. Most patients can be seen within 48 hours. Mention that you were just discharged from the emergency room and need to be seen immediately.   -- See referral list to make follow up appointment with rheumatology in 5-7 days.  -- Take ibuprofen and pepcid as instructed. Your prescription is waiting for you at [facility], [address] [address] [facility] in [place]. Take ibuprofen with meals. If you experience stomach pain or other adverse effect after taking ibuprofen, stop taking it and discuss further with your doctor.  -- Return to ER immediately for new or worsening symptoms or for any concerns.

## 2017-01-12 NOTE — ED ADULT TRIAGE NOTE - CHIEF COMPLAINT QUOTE
c/o chest pressure x4 days with dizziness, feels worse today , 2  asa given by ems  PMH- first degree block, pacemaker candidate- pt reports she  hasn't followed up with cardiologist for a few months

## 2017-01-12 NOTE — ED PROVIDER NOTE - PROGRESS NOTE DETAILS
Gollogly: Pt signed out to me. Pt resting comfortably, NAD, HD stable, A&O x3. Discussed plan from second trop, if normal then d/c with outpt cardiology/rheum follow up. Spoke with pt's PMD Mary Grace Winslow. Dr Winslow aware pt seen in ED for pericarditis with planned cardiology follow up.

## 2017-01-12 NOTE — ED PROVIDER NOTE - MEDICAL DECISION MAKING DETAILS
25yo female with sharp substernal chest pain, not consistent with ACS, will check labs, ekg, cxr, echo, reassess

## 2017-10-22 ENCOUNTER — INPATIENT (INPATIENT)
Facility: HOSPITAL | Age: 27
LOS: 3 days | Discharge: HOME | End: 2017-10-26
Attending: HOSPITALIST | Admitting: HOSPITALIST
Payer: MEDICAID

## 2017-10-22 VITALS
OXYGEN SATURATION: 100 % | RESPIRATION RATE: 16 BRPM | HEART RATE: 73 BPM | SYSTOLIC BLOOD PRESSURE: 127 MMHG | DIASTOLIC BLOOD PRESSURE: 84 MMHG | TEMPERATURE: 98 F

## 2017-10-22 DIAGNOSIS — Z98.891 HISTORY OF UTERINE SCAR FROM PREVIOUS SURGERY: Chronic | ICD-10-CM

## 2017-10-22 LAB
ALBUMIN SERPL ELPH-MCNC: 3.8 G/DL — SIGNIFICANT CHANGE UP (ref 3.3–5)
ALP SERPL-CCNC: 66 U/L — SIGNIFICANT CHANGE UP (ref 40–120)
ALT FLD-CCNC: 23 U/L — SIGNIFICANT CHANGE UP (ref 4–33)
APTT BLD: 29.1 SEC — SIGNIFICANT CHANGE UP (ref 27.5–37.4)
AST SERPL-CCNC: 18 U/L — SIGNIFICANT CHANGE UP (ref 4–32)
BASE EXCESS BLDV CALC-SCNC: 4.2 MMOL/L — SIGNIFICANT CHANGE UP
BASOPHILS # BLD AUTO: 0.04 K/UL — SIGNIFICANT CHANGE UP (ref 0–0.2)
BASOPHILS NFR BLD AUTO: 0.7 % — SIGNIFICANT CHANGE UP (ref 0–2)
BILIRUB SERPL-MCNC: 0.2 MG/DL — SIGNIFICANT CHANGE UP (ref 0.2–1.2)
BLOOD GAS VENOUS - CREATININE: 0.95 MG/DL — SIGNIFICANT CHANGE UP (ref 0.5–1.3)
BUN SERPL-MCNC: 14 MG/DL — SIGNIFICANT CHANGE UP (ref 7–23)
CALCIUM SERPL-MCNC: 8.9 MG/DL — SIGNIFICANT CHANGE UP (ref 8.4–10.5)
CHLORIDE BLDV-SCNC: 105 MMOL/L — SIGNIFICANT CHANGE UP (ref 96–108)
CHLORIDE SERPL-SCNC: 103 MMOL/L — SIGNIFICANT CHANGE UP (ref 98–107)
CK MB BLD-MCNC: 1 NG/ML — SIGNIFICANT CHANGE UP (ref 1–4.7)
CK MB BLD-MCNC: 1 NG/ML — SIGNIFICANT CHANGE UP (ref 1–4.7)
CK MB BLD-MCNC: SIGNIFICANT CHANGE UP (ref 0–2.5)
CK MB BLD-MCNC: SIGNIFICANT CHANGE UP (ref 0–2.5)
CK SERPL-CCNC: 63 U/L — SIGNIFICANT CHANGE UP (ref 25–170)
CK SERPL-CCNC: 66 U/L — SIGNIFICANT CHANGE UP (ref 25–170)
CO2 SERPL-SCNC: 25 MMOL/L — SIGNIFICANT CHANGE UP (ref 22–31)
CREAT SERPL-MCNC: 0.86 MG/DL — SIGNIFICANT CHANGE UP (ref 0.5–1.3)
D DIMER BLD IA.RAPID-MCNC: 272 NG/ML — SIGNIFICANT CHANGE UP
EOSINOPHIL # BLD AUTO: 0.07 K/UL — SIGNIFICANT CHANGE UP (ref 0–0.5)
EOSINOPHIL NFR BLD AUTO: 1.2 % — SIGNIFICANT CHANGE UP (ref 0–6)
GAS PNL BLDV: 135 MMOL/L — LOW (ref 136–146)
GLUCOSE BLDV-MCNC: 79 — SIGNIFICANT CHANGE UP (ref 70–99)
GLUCOSE SERPL-MCNC: 78 MG/DL — SIGNIFICANT CHANGE UP (ref 70–99)
HBA1C BLD-MCNC: 5.3 % — SIGNIFICANT CHANGE UP (ref 4–5.6)
HCG SERPL-ACNC: < 5 MIU/ML — SIGNIFICANT CHANGE UP
HCO3 BLDV-SCNC: 27 MMOL/L — SIGNIFICANT CHANGE UP (ref 20–27)
HCT VFR BLD CALC: 34.6 % — SIGNIFICANT CHANGE UP (ref 34.5–45)
HCT VFR BLDV CALC: 34.8 % — SIGNIFICANT CHANGE UP (ref 34.5–45)
HGB BLD-MCNC: 11 G/DL — LOW (ref 11.5–15.5)
HGB BLDV-MCNC: 11.3 G/DL — LOW (ref 11.5–15.5)
IMM GRANULOCYTES # BLD AUTO: 0.01 # — SIGNIFICANT CHANGE UP
IMM GRANULOCYTES NFR BLD AUTO: 0.2 % — SIGNIFICANT CHANGE UP (ref 0–1.5)
INR BLD: 1.02 — SIGNIFICANT CHANGE UP (ref 0.88–1.17)
LACTATE BLDV-MCNC: 0.7 MMOL/L — SIGNIFICANT CHANGE UP (ref 0.5–2)
LIDOCAIN IGE QN: 20 U/L — SIGNIFICANT CHANGE UP (ref 7–60)
LYMPHOCYTES # BLD AUTO: 2.56 K/UL — SIGNIFICANT CHANGE UP (ref 1–3.3)
LYMPHOCYTES # BLD AUTO: 42.5 % — SIGNIFICANT CHANGE UP (ref 13–44)
MCHC RBC-ENTMCNC: 28.6 PG — SIGNIFICANT CHANGE UP (ref 27–34)
MCHC RBC-ENTMCNC: 31.8 % — LOW (ref 32–36)
MCV RBC AUTO: 90.1 FL — SIGNIFICANT CHANGE UP (ref 80–100)
MONOCYTES # BLD AUTO: 0.48 K/UL — SIGNIFICANT CHANGE UP (ref 0–0.9)
MONOCYTES NFR BLD AUTO: 8 % — SIGNIFICANT CHANGE UP (ref 2–14)
NEUTROPHILS # BLD AUTO: 2.87 K/UL — SIGNIFICANT CHANGE UP (ref 1.8–7.4)
NEUTROPHILS NFR BLD AUTO: 47.4 % — SIGNIFICANT CHANGE UP (ref 43–77)
NRBC # FLD: 0 — SIGNIFICANT CHANGE UP
PCO2 BLDV: 50 MMHG — SIGNIFICANT CHANGE UP (ref 41–51)
PH BLDV: 7.38 PH — SIGNIFICANT CHANGE UP (ref 7.32–7.43)
PLATELET # BLD AUTO: 234 K/UL — SIGNIFICANT CHANGE UP (ref 150–400)
PMV BLD: 10.2 FL — SIGNIFICANT CHANGE UP (ref 7–13)
PO2 BLDV: 26 MMHG — LOW (ref 35–40)
POTASSIUM BLDV-SCNC: 4 MMOL/L — SIGNIFICANT CHANGE UP (ref 3.4–4.5)
POTASSIUM SERPL-MCNC: 4.4 MMOL/L — SIGNIFICANT CHANGE UP (ref 3.5–5.3)
POTASSIUM SERPL-SCNC: 4.4 MMOL/L — SIGNIFICANT CHANGE UP (ref 3.5–5.3)
PROT SERPL-MCNC: 6.8 G/DL — SIGNIFICANT CHANGE UP (ref 6–8.3)
PROTHROM AB SERPL-ACNC: 11.4 SEC — SIGNIFICANT CHANGE UP (ref 9.8–13.1)
RBC # BLD: 3.84 M/UL — SIGNIFICANT CHANGE UP (ref 3.8–5.2)
RBC # FLD: 13.1 % — SIGNIFICANT CHANGE UP (ref 10.3–14.5)
SAO2 % BLDV: 43.4 % — LOW (ref 60–85)
SODIUM SERPL-SCNC: 140 MMOL/L — SIGNIFICANT CHANGE UP (ref 135–145)
TROPONIN T SERPL-MCNC: < 0.06 NG/ML — SIGNIFICANT CHANGE UP (ref 0–0.06)
TROPONIN T SERPL-MCNC: < 0.06 NG/ML — SIGNIFICANT CHANGE UP (ref 0–0.06)
WBC # BLD: 6.03 K/UL — SIGNIFICANT CHANGE UP (ref 3.8–10.5)
WBC # FLD AUTO: 6.03 K/UL — SIGNIFICANT CHANGE UP (ref 3.8–10.5)

## 2017-10-22 PROCEDURE — 71020: CPT | Mod: 26

## 2017-10-22 PROCEDURE — 71275 CT ANGIOGRAPHY CHEST: CPT | Mod: 26

## 2017-10-22 RX ORDER — ACETAMINOPHEN 500 MG
1000 TABLET ORAL ONCE
Qty: 0 | Refills: 0 | Status: COMPLETED | OUTPATIENT
Start: 2017-10-22 | End: 2017-10-22

## 2017-10-22 RX ORDER — ONDANSETRON 8 MG/1
4 TABLET, FILM COATED ORAL ONCE
Qty: 0 | Refills: 0 | Status: COMPLETED | OUTPATIENT
Start: 2017-10-22 | End: 2017-10-22

## 2017-10-22 RX ORDER — METOCLOPRAMIDE HCL 10 MG
10 TABLET ORAL ONCE
Qty: 0 | Refills: 0 | Status: COMPLETED | OUTPATIENT
Start: 2017-10-22 | End: 2017-10-22

## 2017-10-22 RX ORDER — SODIUM CHLORIDE 9 MG/ML
1000 INJECTION INTRAMUSCULAR; INTRAVENOUS; SUBCUTANEOUS ONCE
Qty: 0 | Refills: 0 | Status: COMPLETED | OUTPATIENT
Start: 2017-10-22 | End: 2017-10-22

## 2017-10-22 RX ORDER — ACYCLOVIR SODIUM 500 MG
800 VIAL (EA) INTRAVENOUS THREE TIMES A DAY
Qty: 0 | Refills: 0 | Status: DISCONTINUED | OUTPATIENT
Start: 2017-10-22 | End: 2017-10-24

## 2017-10-22 RX ORDER — FAMOTIDINE 10 MG/ML
20 INJECTION INTRAVENOUS ONCE
Qty: 0 | Refills: 0 | Status: COMPLETED | OUTPATIENT
Start: 2017-10-22 | End: 2017-10-22

## 2017-10-22 RX ORDER — KETOROLAC TROMETHAMINE 30 MG/ML
15 SYRINGE (ML) INJECTION ONCE
Qty: 0 | Refills: 0 | Status: DISCONTINUED | OUTPATIENT
Start: 2017-10-22 | End: 2017-10-22

## 2017-10-22 RX ADMIN — ONDANSETRON 4 MILLIGRAM(S): 8 TABLET, FILM COATED ORAL at 10:31

## 2017-10-22 RX ADMIN — Medication 15 MILLIGRAM(S): at 12:21

## 2017-10-22 RX ADMIN — Medication 400 MILLIGRAM(S): at 10:31

## 2017-10-22 RX ADMIN — Medication 1000 MILLIGRAM(S): at 11:01

## 2017-10-22 RX ADMIN — Medication 15 MILLIGRAM(S): at 11:51

## 2017-10-22 RX ADMIN — FAMOTIDINE 20 MILLIGRAM(S): 10 INJECTION INTRAVENOUS at 10:31

## 2017-10-22 RX ADMIN — Medication 10 MILLIGRAM(S): at 13:03

## 2017-10-22 RX ADMIN — Medication 800 MILLIGRAM(S): at 22:59

## 2017-10-22 RX ADMIN — ONDANSETRON 4 MILLIGRAM(S): 8 TABLET, FILM COATED ORAL at 11:33

## 2017-10-22 RX ADMIN — SODIUM CHLORIDE 1000 MILLILITER(S): 9 INJECTION INTRAMUSCULAR; INTRAVENOUS; SUBCUTANEOUS at 10:44

## 2017-10-22 NOTE — ED CDU PROVIDER DISPOSITION NOTE - CLINICAL COURSE
see cdu note and progress note asp t is being admitted for second degree ehart block type 1, HR in 40's, seen by cards np
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
## 2017-10-22 NOTE — ED ADULT TRIAGE NOTE - CHIEF COMPLAINT QUOTE
Pt evaluated at [facility] and DX with [place] Palsy and started on medication.  Pt f/u with PMD and instructed to continue current medication regimen.  Pt endorses CP and vomiting. Pt presents for another opinion.

## 2017-10-22 NOTE — ED ADULT NURSE NOTE - OBJECTIVE STATEMENT
PT is AxOx4, ambulates, c/o chest pain, nausea and headache, recently diagnosed with BEll's Palsy.  PMH of Pericarditis, medicated with Tylenol IV for pain, children by the pts's bed. EKG done, Tely monitor in progress, continue to monitor.

## 2017-10-22 NOTE — ED CDU PROVIDER INITIAL DAY NOTE - PROGRESS NOTE DETAILS
CDU JOSH QURESHI: pt examined at bedside, denies any current chest pain, sob, abdominal pain, I cdu for cex2, + echo, however pt HR keeps dropping to 40-42-46, has no cardiologist, will call house cards for a consult and follow their A/P and reasses CDU JOSH QURESHI: I spoke with cards no, they want pt admitted to hospitalist and they will follow pt in-house and see pt, I spoke with dr. rothman who accepted the patient

## 2017-10-22 NOTE — ED CDU PROVIDER INITIAL DAY NOTE - CHPI ED SYMPTOMS NEG
no cough/no vomiting/no fever/No abdominal pain, no leg edema, no pain, no rashes, no other complaints

## 2017-10-22 NOTE — ED ADULT NURSE NOTE - CHIEF COMPLAINT QUOTE
Pt evaluated at [facility] and DX with [place] Palsy and started on medication.  Pt f/u with PMD and instructed to continue current medication regimen.  Pt endorses CP and vomiting. Pt presents for another opinion.

## 2017-10-22 NOTE — ED PROVIDER NOTE - PROGRESS NOTE DETAILS
Pt's children picked up by a male family member. Pt reports persistent chest pain and continued R sided facial pressure despite tx. D-dimer added. CXR and Reglan ordered. Will reassess. Pt reports R facial and intermittent but improved L chest pain. Repeat EKG showed 2nd degree Mobitz I block with 2 dropped beats. Pt's dc summary from 12/2016 visit describes similar event on tele monitoring for which no acute intervention was recommended by Cards/EP at that time. CTA reveals no PE. Trop neg x 2. Plan tele monitoring, pain control. Echo in am ro pericardial effusion/evidence of pericarditis. Pt aware and agree with plan.

## 2017-10-22 NOTE — ED CDU PROVIDER DISPOSITION NOTE - ATTENDING CONTRIBUTION TO CARE
CDU MD DUTTON:  I performed a face to face bedside interview with patient regarding history of present illness, review of symptoms and past medical history. I completed an independent physical exam.  I have discussed patient's plan of care with PA.   I agree with note as stated above, having amended the EMR as needed to reflect my findings. I have discussed the assessment and plan of care.  This includes during the time I functioned as the attending physician for this patient.

## 2017-10-22 NOTE — ED PROVIDER NOTE - MEDICAL DECISION MAKING DETAILS
Recent bell's dx p/w r sided HA and chronic intermittent L sided CP, EKG as noted. Plan - cardiac monitor, basic labs including troponin, sx control, reassess, will perform Chest XR once pt's Mother arrives in the ER. Recent bell's dx p/w r sided HA and chronic intermittent L sided CP, EKG as noted. Plan - cardiac monitor, basic labs including troponin x 2, sx control, reassess, will perform Chest XR once pt's Mother arrives in the ER.

## 2017-10-22 NOTE — ED CDU PROVIDER INITIAL DAY NOTE - ATTENDING CONTRIBUTION TO CARE
CDU MD DUTTON:  I performed a face to face bedside interview with patient regarding history of present illness, review of symptoms and past medical history. I completed an independent physical exam.  I have discussed patient's plan of care with PA.   I agree with note as stated above, having amended the EMR as needed to reflect my findings. I have discussed the assessment and plan of care.  This includes during the time I functioned as the attending physician for this patient.    26 y/o female with h/o pericarditis in past year with chronic chest pain since that time here for eval of cp.  Chest pain is central non-rad, similar to chronic cp in past.  Recent dx of bells, on steroids and antivirals with good resolution of right facial droop.  Additional c/o ha, n/v since starting bells meds.  Denies f/c, ha, neck stiffness, sob, cough, abd pain, dysuria, rash.  Afebrile, vs wnl, nad, mild right facial droop, ctabil, s1s2 rrr no m/r/g, abd soft non ttp no r/g, no cva tenderness b/l, no leg swelling b/l, no rash.  During stay in ED, had two sets of neg ce's, ctpa neg for pe and ekg which demonstrated new second degree mobitz type 1 heart block.  Sent to CDU for continued tele monitoring, echo to eval for effusion or other structural abnormality.

## 2017-10-22 NOTE — ED CDU PROVIDER INITIAL DAY NOTE - OBJECTIVE STATEMENT
26 y/o F w/ PMHx of Pericarditis dx 2016, 1st degree AV block, prior negative workup for Lyme dz, recently dx w/ Bell's Palsy x6days ago, on "Prednisone 60 QD, acyclovir 800 TID, famotidine 20mg BID" presents to ED w/ multiple complaints. Pt reports R sided HA since this AM, described as throbbing, retroorbital and temporal. Pt also reports CP since 8:00AM today and SOB since 2:00AM today. Rates pain 9/10. Pt has had chronic intermittent L sided CP since her dx of pericarditis. States pain is unchanged from prior. Pt also reports decreased PO intake secondary to nausea and vomiting x3days. No abdominal pain, no leg edema, no pain, no rashes, no other complaints. Pt returned from [place] 2017. Followed up w/ her PMD, Dr. Winslow( [phone]) yesterday who advised her to continue current Pitts's tx if she can tolerate GI symptoms. LMP x1wk ago. No smoking, EtOH use, or drug use. Past surgical h/o . Of note pt is accompanied by 2 young children, and advised that an adult needs to come in and watch children while pt is getting ER workup. Pt states her mother will come.  ER Note Above: JOSH Padron 26 y/o female with a hx of pericarditis, 1st degree AV block presents to the ER c/o Chest pain and shortness of breath since this AM.  Pt currently being tx for Right sided Bell's Palsy.  Pt reports nausea and vomiting for the past 3 days improved.  Pt reports right sided facial pain and headache. Pt denies fevers, chills, recent illness.  Pt does not have a cardiologist.

## 2017-10-22 NOTE — ED PROVIDER NOTE - OBJECTIVE STATEMENT
26 y/o F w/ PMHx of Pericarditis dx 2016, 1st degree AV block, prior negative workup for Lime dz, recently dx w/ Bell's Palsy x6days ago, on "Prednisone 60 DLE, acyclovir 800 TID, famotidine 20mg BID" presents to ED w/ multiple complaints. Pt reports R sided HA since this AM, described as throbbing, retroorbital and temporal. Pt also reports CP since 8:00AM today and SOB since 2:00AM today. Rates pain 9/10. Pt has had chronic intermittent L sided CP since her dx of pericarditis. States pain is unchanged from prior. Pt also reports decreased PO intake secondary to nausea and vomiting x3days. No abdominal pain, no leg edema, no pain, no rashes, no other complaints. Pt returned from [place] 2017 and now filed w/ her PMD, Dr. Winslow( [phone]). Pt saw Dr. Winslow yesterday who advised her to current Bell's tx. LMP x1wk ago. No smoking, EtOH use, or drug use. Past surgical h/o . Of note pt is accompanied by 2 young children, and advised that an adult needs to come in and watch children while pt is getting ER workup. Pt states her mother will come. 28 y/o F w/ PMHx of Pericarditis dx 2016, 1st degree AV block, prior negative workup for Lyme dz, recently dx w/ Bell's Palsy x6days ago, on "Prednisone 60 QD, acyclovir 800 TID, famotidine 20mg BID" presents to ED w/ multiple complaints. Pt reports R sided HA since this AM, described as throbbing, retroorbital and temporal. Pt also reports CP since 8:00AM today and SOB since 2:00AM today. Rates pain 9/10. Pt has had chronic intermittent L sided CP since her dx of pericarditis. States pain is unchanged from prior. Pt also reports decreased PO intake secondary to nausea and vomiting x3days. No abdominal pain, no leg edema, no pain, no rashes, no other complaints. Pt returned from [place] 2017. Followed up w/ her PMD, Dr. Winslow( [phone]) yesterday who advised her to continue current Pitts's tx if she can tolerate GI symptoms. LMP x1wk ago. No smoking, EtOH use, or drug use. Past surgical h/o . Of note pt is accompanied by 2 young children, and advised that an adult needs to come in and watch children while pt is getting ER workup. Pt states her mother will come.

## 2017-10-23 DIAGNOSIS — G51.0 BELL'S PALSY: ICD-10-CM

## 2017-10-23 DIAGNOSIS — R07.81 PLEURODYNIA: ICD-10-CM

## 2017-10-23 DIAGNOSIS — Z29.9 ENCOUNTER FOR PROPHYLACTIC MEASURES, UNSPECIFIED: ICD-10-CM

## 2017-10-23 DIAGNOSIS — I44.1 ATRIOVENTRICULAR BLOCK, SECOND DEGREE: ICD-10-CM

## 2017-10-23 LAB
ALBUMIN SERPL ELPH-MCNC: 3.5 G/DL — SIGNIFICANT CHANGE UP (ref 3.3–5)
ALP SERPL-CCNC: 62 U/L — SIGNIFICANT CHANGE UP (ref 40–120)
ALT FLD-CCNC: 18 U/L — SIGNIFICANT CHANGE UP (ref 4–33)
AST SERPL-CCNC: 14 U/L — SIGNIFICANT CHANGE UP (ref 4–32)
BASOPHILS # BLD AUTO: 0.04 K/UL — SIGNIFICANT CHANGE UP (ref 0–0.2)
BASOPHILS NFR BLD AUTO: 0.7 % — SIGNIFICANT CHANGE UP (ref 0–2)
BILIRUB SERPL-MCNC: 0.4 MG/DL — SIGNIFICANT CHANGE UP (ref 0.2–1.2)
BUN SERPL-MCNC: 12 MG/DL — SIGNIFICANT CHANGE UP (ref 7–23)
CALCIUM SERPL-MCNC: 8.9 MG/DL — SIGNIFICANT CHANGE UP (ref 8.4–10.5)
CHLORIDE SERPL-SCNC: 102 MMOL/L — SIGNIFICANT CHANGE UP (ref 98–107)
CO2 SERPL-SCNC: 25 MMOL/L — SIGNIFICANT CHANGE UP (ref 22–31)
CREAT SERPL-MCNC: 1.05 MG/DL — SIGNIFICANT CHANGE UP (ref 0.5–1.3)
CRP SERPL-MCNC: 3.8 MG/L — SIGNIFICANT CHANGE UP
EOSINOPHIL # BLD AUTO: 0.11 K/UL — SIGNIFICANT CHANGE UP (ref 0–0.5)
EOSINOPHIL NFR BLD AUTO: 2 % — SIGNIFICANT CHANGE UP (ref 0–6)
ERYTHROCYTE [SEDIMENTATION RATE] IN BLOOD: 23 MM/HR — SIGNIFICANT CHANGE UP (ref 4–25)
GLUCOSE SERPL-MCNC: 80 MG/DL — SIGNIFICANT CHANGE UP (ref 70–99)
HCT VFR BLD CALC: 32.1 % — LOW (ref 34.5–45)
HGB BLD-MCNC: 10.3 G/DL — LOW (ref 11.5–15.5)
HIV 1+2 AB+HIV1 P24 AG SERPL QL IA: SIGNIFICANT CHANGE UP
IMM GRANULOCYTES # BLD AUTO: 0.02 # — SIGNIFICANT CHANGE UP
IMM GRANULOCYTES NFR BLD AUTO: 0.4 % — SIGNIFICANT CHANGE UP (ref 0–1.5)
LYMPHOCYTES # BLD AUTO: 2.81 K/UL — SIGNIFICANT CHANGE UP (ref 1–3.3)
LYMPHOCYTES # BLD AUTO: 49.9 % — HIGH (ref 13–44)
MAGNESIUM SERPL-MCNC: 1.9 MG/DL — SIGNIFICANT CHANGE UP (ref 1.6–2.6)
MCHC RBC-ENTMCNC: 28.1 PG — SIGNIFICANT CHANGE UP (ref 27–34)
MCHC RBC-ENTMCNC: 32.1 % — SIGNIFICANT CHANGE UP (ref 32–36)
MCV RBC AUTO: 87.5 FL — SIGNIFICANT CHANGE UP (ref 80–100)
MONOCYTES # BLD AUTO: 0.48 K/UL — SIGNIFICANT CHANGE UP (ref 0–0.9)
MONOCYTES NFR BLD AUTO: 8.5 % — SIGNIFICANT CHANGE UP (ref 2–14)
NEUTROPHILS # BLD AUTO: 2.17 K/UL — SIGNIFICANT CHANGE UP (ref 1.8–7.4)
NEUTROPHILS NFR BLD AUTO: 38.5 % — LOW (ref 43–77)
NRBC # FLD: 0 — SIGNIFICANT CHANGE UP
PHOSPHATE SERPL-MCNC: 3.9 MG/DL — SIGNIFICANT CHANGE UP (ref 2.5–4.5)
PLATELET # BLD AUTO: 211 K/UL — SIGNIFICANT CHANGE UP (ref 150–400)
PMV BLD: 10.4 FL — SIGNIFICANT CHANGE UP (ref 7–13)
POTASSIUM SERPL-MCNC: 4.4 MMOL/L — SIGNIFICANT CHANGE UP (ref 3.5–5.3)
POTASSIUM SERPL-SCNC: 4.4 MMOL/L — SIGNIFICANT CHANGE UP (ref 3.5–5.3)
PROT SERPL-MCNC: 6.3 G/DL — SIGNIFICANT CHANGE UP (ref 6–8.3)
RBC # BLD: 3.67 M/UL — LOW (ref 3.8–5.2)
RBC # FLD: 13.2 % — SIGNIFICANT CHANGE UP (ref 10.3–14.5)
SODIUM SERPL-SCNC: 139 MMOL/L — SIGNIFICANT CHANGE UP (ref 135–145)
TSH SERPL-MCNC: 0.89 UIU/ML — SIGNIFICANT CHANGE UP (ref 0.27–4.2)
WBC # BLD: 5.63 K/UL — SIGNIFICANT CHANGE UP (ref 3.8–10.5)
WBC # FLD AUTO: 5.63 K/UL — SIGNIFICANT CHANGE UP (ref 3.8–10.5)

## 2017-10-23 PROCEDURE — 99223 1ST HOSP IP/OBS HIGH 75: CPT | Mod: GC

## 2017-10-23 PROCEDURE — 12345: CPT | Mod: NC

## 2017-10-23 PROCEDURE — 70450 CT HEAD/BRAIN W/O DYE: CPT | Mod: 26

## 2017-10-23 PROCEDURE — 93306 TTE W/DOPPLER COMPLETE: CPT | Mod: 26

## 2017-10-23 PROCEDURE — 99222 1ST HOSP IP/OBS MODERATE 55: CPT

## 2017-10-23 PROCEDURE — 99222 1ST HOSP IP/OBS MODERATE 55: CPT | Mod: GC

## 2017-10-23 RX ORDER — IBUPROFEN 200 MG
400 TABLET ORAL EVERY 6 HOURS
Qty: 0 | Refills: 0 | Status: DISCONTINUED | OUTPATIENT
Start: 2017-10-23 | End: 2017-10-26

## 2017-10-23 RX ORDER — ONDANSETRON 8 MG/1
6 TABLET, FILM COATED ORAL EVERY 8 HOURS
Qty: 0 | Refills: 0 | Status: DISCONTINUED | OUTPATIENT
Start: 2017-10-23 | End: 2017-10-26

## 2017-10-23 RX ORDER — MECLIZINE HCL 12.5 MG
12.5 TABLET ORAL THREE TIMES A DAY
Qty: 0 | Refills: 0 | Status: DISCONTINUED | OUTPATIENT
Start: 2017-10-23 | End: 2017-10-26

## 2017-10-23 RX ADMIN — Medication 400 MILLIGRAM(S): at 08:45

## 2017-10-23 RX ADMIN — Medication 800 MILLIGRAM(S): at 05:42

## 2017-10-23 RX ADMIN — Medication 800 MILLIGRAM(S): at 13:11

## 2017-10-23 RX ADMIN — Medication 800 MILLIGRAM(S): at 22:17

## 2017-10-23 RX ADMIN — Medication 400 MILLIGRAM(S): at 07:30

## 2017-10-23 NOTE — H&P ADULT - NSHPLABSRESULTS_GEN_ALL_CORE
.  LABS:                         11.0   6.03  )-----------( 234      ( 22 Oct 2017 10:22 )             34.6     10-22    140  |  103  |  14  ----------------------------<  78  4.4   |  25  |  0.86    Ca    8.9      22 Oct 2017 10:20    TPro  6.8  /  Alb  3.8  /  TBili  0.2  /  DBili  x   /  AST  18  /  ALT  23  /  AlkPhos  66  10-22    PT/INR - ( 22 Oct 2017 10:20 )   PT: 11.4 SEC;   INR: 1.02          PTT - ( 22 Oct 2017 10:20 )  PTT:29.1 SEC          RADIOLOGY, EKG & ADDITIONAL TESTS: Reviewed.     EKG: first EKG (10/22) with first degree AV block. 2nd EKG (10/22) with mobitz type I AV block

## 2017-10-23 NOTE — CONSULT NOTE ADULT - ATTENDING COMMENTS
Echo reviewed, normal LV and no effusion. I'm not convinced this is pericarditis. Monitor tele x 24 hours for new Type I 2nd degree HB, which is likely vagally mediated.
Bell's palsy in 26 yo woman with h/o pericarditis and heart block 2016.  Doubt related to Lyme; does not have epidemiologic risk.  Would check Lyme ALLIE (testing) with confirmatory Western blot if positive or equivocal.  Check HIV, QuatiferonTB gold, syphilis serology.  Would look for other etiology for heart block and cranial nerve palsy including non infectious causes such as sarcoid.

## 2017-10-23 NOTE — H&P ADULT - PROBLEM SELECTOR PLAN 3
-c/w home prednisone and acyclovir  -antiemetic PRN -c/w home acyclovir  -antiemetic PRN  -ibuprofen PRN for pain

## 2017-10-23 NOTE — H&P ADULT - ATTENDING COMMENTS
Pt resting comfortably.  Chest pain improved without other new symptoms.  Tele monitor reviewed presently showing sinus rhythm with 1st deg AV block with occasional Mobitz I 2nd degree block.  Currently asymptomatic.  Serial EKG's reviewed showing 1st degree and Mobitz 1 AV block.    Will f/u further cardiology recs  continue to monitor on tele.

## 2017-10-23 NOTE — CONSULT NOTE ADULT - ASSESSMENT
28 y/o F w/ PMHx of Pericarditis dx 2016, Mobitz type I AV block, prior negative workup for Lyme dz (2016), recently dx w/ Bell's Palsy 1 wk ago on prednisone 60mg Qd and acyclovir 800mg TID presents a/w pleuritic chest pain and Mobitz type I AV block on EKG

## 2017-10-23 NOTE — H&P ADULT - NSHPREVIEWOFSYSTEMS_GEN_ALL_CORE
REVIEW OF SYSTEMS:  CONSTITUTIONAL: No weight loss, or fatigue  EYES: No eye pain, visual disturbances, or discharge  ENMT:  No difficulty hearing, tinnitus, vertigo; No sinus or throat pain  NECK: No pain or stiffness  RESPIRATORY: No cough, wheezing, chills or hemoptysis; No shortness of breath  CARDIOVASCULAR: No palpitations, dizziness, or leg swelling, +CP  GASTROINTESTINAL: No abdominal or epigastric pain. No hematemesis; No diarrhea or constipation. +n/v  GENITOURINARY: No dysuria, frequency, hematuria, or incontinence  NEUROLOGICAL: memory loss, loss of strength, numbness, or tremors + headache  SKIN: No itching, burning, rashes, or lesions   LYMPH NODES: No enlarged glands  ENDOCRINE: No heat or cold intolerance; No hair loss  MUSCULOSKELETAL: No joint pain or swelling; No muscle, back, or extremity pain  PSYCHIATRIC: No depression, anxiety, mood swings, or difficulty sleeping

## 2017-10-23 NOTE — H&P ADULT - ASSESSMENT
28 y/o F w/ PMHx of Pericarditis dx 2016, Mobitz type I AV block, prior negative workup for Lyme dz, recently dx w/ Bell's Palsy 1 wk ago on prednisone and acyclovir presents a/w pleuritic chest pain and 2nd degree AV block.

## 2017-10-23 NOTE — H&P ADULT - PROBLEM SELECTOR PLAN 2
- -unclear etiology, possibly related to hx of pericarditis  -Lyme workup in 12/2016 was negative  -continue TELE monitoring

## 2017-10-23 NOTE — CONSULT NOTE ADULT - SUBJECTIVE AND OBJECTIVE BOX
HPI:  26 y/o F w/ PMHx of Pericarditis dx 2016, Mobitz type I AV block, prior negative workup for Lyme dz (2016), recently dx w/ Bell's Palsy 1 wk ago on prednisone 60mg qd and acyclovir 800mg TID presents with n/v and Chest pain (on 3 different occasions since Dec 2016). Patient reports waking up on midnight PTA with subjective fever that resolved and Motrin. She woke up in the morning at 8AM with intermittent chest pain that is midsternal and sharp. It is 8/10, non radiating and last 2 minutes and worsens with deep breath She was diagnosed with Rt sided Bell's palsy 1 week ago due to right face pain, HA and rt facial droop. She started to develop nausea/vomiting with acyclovir 3 days after starting taking it. No recent traveling outside [place], abdominal pain, chill, dysuria, SOB, palpitation. Pt. returned from [place] to [place] this past august.    In the ED, T 98, HR 74, bP 116/72, RR 18, Sat 100RA. Patient received IV tylenol, pepcid 20mg IV, Ketorolac 15mg IVP, reglan 10mg IVP, zofran 4mg IVP x2. Patient was monitored in CDU and n/v is now resolving. (23 Oct 2017 03:07) Cardiac Enzymes negative x2, CTA (-) for PE, CXR clear, EKG notable for Mobitz Type I HR. Echo: LV normal, no effusion. Cards has low suspicion for pericarditis.           PAST MEDICAL & SURGICAL HISTORY:  Pre-eclampsia during second pregnancy  Pericarditis  S/P  section: x1      Allergies  meat (Swelling)  No Known Drug Allergies        ANTIMICROBIALS:  acyclovir   Tablet 800 three times a day      OTHER MEDS: MEDICATIONS  (STANDING):  ibuprofen  Tablet 400 every 6 hours PRN  meclizine 12.5 three times a day PRN  ondansetron Injectable 6 every 8 hours PRN      SOCIAL HISTORY:  [ ] etoh [ ] tobacco [ ] former smoker [ ] IVDU    FAMILY HISTORY:  No pertinent family history in first degree relatives      REVIEW OF SYSTEMS  [  ] ROS unobtainable because:    [X] All other systems negative except as noted below:	    Constitutional:  [ ] fever [ ] weight loss  Skin:  [ ] rash [ ] phlebitis	  Eyes: [ ] icterus [ ] inflammation	  ENMT: [ ] discharge [ ] thrush [ ] ulcers [ ] exudates  Respiratory: [ ] dyspnea [ ] hemoptysis [ ] cough [ ] sputum	  Cardiovascular:  [ ] chest pain [ ] palpitations [ ] edema	  Gastrointestinal:  [ ] nausea [ ] vomiting [ ] diarrhea [ ] constipation [ ] pain	  Genitourinary:  [ ] dysuria [ ] frequency [ ] hematuria [ ] discharge [ ] flank pain  Musculoskeletal:  [ ] myalgias [ ] arthralgias [ ] arthritis	  Neurological:  [ ] headache [ ] seizures	  Psychiatric:  [ ] anxiety [ ] depression	  Hematology/Lymphatics:  [ ] lymphadenopathy  Endocrine:  [ ] adrenal [ ] thyroid  Allergic/Immunologic:	 [ ] transplant [ ] seasonal    Vital Signs Last 24 Hrs  T(F): 98.3 (10-23-17 @ 10:57), Max: 98.3 (10-23-17 @ 10:57)    Vital Signs Last 24 Hrs  HR: 73 (10-23-17 @ 10:57) (53 - 74)  BP: 116/71 (10-23-17 @ 10:57) (115/60 - 124/82)  RR: 16 (10-23-17 @ 10:57)  SpO2: 98% (10-23-17 @ 10:57) (97% - 100%)  Wt(kg): --    PHYSICAL EXAM:  General: non-toxic  HEAD/EYES: anicteric, PERRL  ENT:  supple  Cardiovascular:   S1, S2  Respiratory:  clear bilaterally  GI:  soft, non-tender, normal bowel sounds  :  no CVA tenderness   Musculoskeletal:  no synovitis  Neurologic:  grossly non-focal  Skin:  no rash  Lymph: no lymphadenopathy  Psychiatric:  appropriate affect  Vascular:  no phlebitis                                10.3   5.63  )-----------( 211      ( 23 Oct 2017 05:30 )             32.1       10-23    139  |  102  |  12  ----------------------------<  80  4.4   |  25  |  1.05    Ca    8.9      23 Oct 2017 05:30  Phos  3.9     10-23  Mg     1.9     10-23    TPro  6.3  /  Alb  3.5  /  TBili  0.4  /  DBili  x   /  AST  14  /  ALT  18  /  AlkPhos  62  10-23          MICROBIOLOGY:  Lyme IgG IgM pending        RADIOLOGY/EKG:  CXR - Clear  CTA - Negative for PE  EKG- Mobitz Type 1 HB  Echo - LV function normal, no pericardial effusion HPI:  26 y/o F w/ PMHx of Pericarditis dx 2016, Mobitz type I AV block, prior negative workup for Lyme dz (2016), recently dx w/ Bell's Palsy 1 wk ago on prednisone 60mg qd (total 2 days) and acyclovir 800mg TID presents with n/v and Chest pain (on 3 different occasions since Dec 2016). Patient reports waking up on midnight PTA with subjective fever that resolved and Motrin. She woke up in the morning at 8AM with intermittent chest pain that is midsternal and sharp. It is 8/10, non radiating and last 2 minutes and worsens with deep breath She was diagnosed with Rt sided Bell's palsy 1 week ago due to right face pain, HA and rt facial droop. She started to develop nausea/vomiting with acyclovir 3 days after starting taking it. No recent traveling outside [place], abdominal pain, chill, dysuria, SOB, palpitation. Pt. returned from [place] to [place] this past august.    In the ED, T 98, HR 74, bP 116/72, RR 18, Sat 100RA. Patient received IV tylenol, pepcid 20mg IV, Ketorolac 15mg IVP, reglan 10mg IVP, zofran 4mg IVP x2. Patient was monitored in CDU and n/v is now resolving. (23 Oct 2017 03:07) Cardiac Enzymes negative x2, CTA (-) for PE, CXR clear, EKG notable for Mobitz Type I HR. Echo: LV normal, no effusion. Cards has low suspicion for pericarditis.     Pt. seen and examined at bedside. Pt. still has mild 6/10 non radiating, non-positional midsternal CP associated with mild SOB, nausea, vertigo, and right sided facial soreness.       PAST MEDICAL & SURGICAL HISTORY:  Pre-eclampsia during second pregnancy  Pericarditis  S/P  section: x1      Allergies  meat (beef) (Swelling)  No Known Drug Allergies        ANTIMICROBIALS:  acyclovir   Tablet 800 three times a day      OTHER MEDS: MEDICATIONS  (STANDING):  ibuprofen  Tablet 400 every 6 hours PRN  meclizine 12.5 three times a day PRN  ondansetron Injectable 6 every 8 hours PRN      SOCIAL HISTORY:  [ ] etoh [X] tobacco 3month hx [ ] former smoker [ ] IVDU   Has 2 children, single mother    FAMILY HISTORY:  No pertinent family history in first degree relatives      REVIEW OF SYSTEMS  [  ] ROS unobtainable because:    [X] All other systems negative except as noted below:	    Constitutional:  [ ] fever [ ] weight loss  Skin:  [ ] rash [ ] phlebitis	  Eyes: [ ] icterus [ ] inflammation	  ENMT: [ ] discharge [ ] thrush [ ] ulcers [ ] exudates  Respiratory: [X] dyspnea [ ] hemoptysis [ ] cough [ ] sputum	  Cardiovascular:  [X] chest pain [X] palpitations [ ] edema	  Gastrointestinal:  [X] nausea [ ] vomiting [ ] diarrhea [ ] constipation [ ] pain	  Genitourinary:  [ ] dysuria [ ] frequency [ ] hematuria [ ] discharge [ ] flank pain  Musculoskeletal:  [ ] myalgias [ ] arthralgias [ ] arthritis	  Neurological:  [ ] headache [ ] seizures [X] Dizziness	  Psychiatric:  [ ] anxiety [ ] depression	  Hematology/Lymphatics:  [ ] lymphadenopathy  Endocrine:  [ ] adrenal [ ] thyroid  Allergic/Immunologic:	 [ ] transplant [ ] seasonal    Vital Signs Last 24 Hrs  T(F): 98.3 (10-23-17 @ 10:57), Max: 98.3 (10-23-17 @ 10:57)    Vital Signs Last 24 Hrs  HR: 73 (10-23-17 @ 10:57) (53 - 74)  BP: 116/71 (10-23-17 @ 10:57) (115/60 - 124/82)  RR: 16 (10-23-17 @ 10:57)  SpO2: 98% (10-23-17 @ 10:57) (97% - 100%)  Wt(kg): --    PHYSICAL EXAM:  General: non-toxic, no acute distress , resting in bed  HEAD/EYES: anicteric, PERRL  ENT:  supple  Cardiovascular:   S1, S2   Respiratory:  clear bilaterally  GI:  soft, non-tender, normal bowel sounds  :  no CVA tenderness   Musculoskeletal:  no synovitis  Neurologic:  grossly non-focal, R. side droop and dec sesation  Skin:  no rash  Lymph: no lymphadenopathy  Psychiatric:  appropriate affect  Vascular:  no phlebitis                                10.3   5.63  )-----------( 211      ( 23 Oct 2017 05:30 )             32.1       10-23    139  |  102  |  12  ----------------------------<  80  4.4   |  25  |  1.05    Ca    8.9      23 Oct 2017 05:30  Phos  3.9     10-23  Mg     1.9     10-23    TPro  6.3  /  Alb  3.5  /  TBili  0.4  /  DBili  x   /  AST  14  /  ALT  18  /  AlkPhos  62  10-          MICROBIOLOGY:  Lyme IgG IgM pending        RADIOLOGY/EKG:  CXR - Clear  CTA - Negative for PE  EKG- Mobitz Type 1 HB  Echo - LV function normal, no pericardial effusion HPI:  26 y/o F w/ PMHx of Pericarditis dx 2016, Mobitz type I AV block, prior negative workup for Lyme dz (2016), recently dx w/ Bell's Palsy 1 wk ago on prednisone 60mg qd (total 2 days) and acyclovir 800mg TID presents with n/v and Chest pain (on 3 different occasions since Dec 2016). Patient reports waking up on midnight PTA with subjective fever that resolved and Motrin. She woke up in the morning at 8AM with intermittent chest pain that is midsternal and sharp. It is 8/10, non radiating and last 2 minutes and worsens with deep breath She was diagnosed with Rt sided Bell's palsy 1 week ago due to right face pain, HA and rt facial droop. She started to develop nausea/vomiting with acyclovir 3 days after starting taking it. No recent traveling outside [place], abdominal pain, chill, dysuria, SOB, palpitation. Pt. returned from [place] to [place] this past august.    In the ED, T 98, HR 74, bP 116/72, RR 18, Sat 100RA. Patient received IV tylenol, pepcid 20mg IV, Ketorolac 15mg IVP, reglan 10mg IVP, zofran 4mg IVP x2. Patient was monitored in CDU and n/v is now resolving. (23 Oct 2017 03:07) Cardiac Enzymes negative x2, CTA (-) for PE, CXR clear, EKG notable for Mobitz Type I HR. Echo: LV normal, no effusion. Cards has low suspicion for pericarditis.     Pt. seen and examined at bedside. Pt. still has mild 6/10 non radiating, non-positional midsternal CP associated with mild SOB, nausea, vertigo, and right sided facial soreness.     ID- born [facility]. No known tick exposure.  [place] No camping.  No joint complaints.      PAST MEDICAL & SURGICAL HISTORY:  Pre-eclampsia during second pregnancy  Pericarditis  S/P  section: x1      Allergies  meat (beef) (Swelling)  No Known Drug Allergies        ANTIMICROBIALS:  acyclovir   Tablet 800 three times a day      OTHER MEDS: MEDICATIONS  (STANDING):  ibuprofen  Tablet 400 every 6 hours PRN  meclizine 12.5 three times a day PRN  ondansetron Injectable 6 every 8 hours PRN      SOCIAL HISTORY:  [ ] etoh [X] tobacco 3month hx [ ] former smoker [ ] IVDU   Has 2 children, single mother    FAMILY HISTORY:  No pertinent family history in first degree relatives      REVIEW OF SYSTEMS  [  ] ROS unobtainable because:    [X] All other systems negative except as noted below:	    Constitutional:  [ ] fever [ ] weight loss  Skin:  [ ] rash [ ] phlebitis	  Eyes: [ ] icterus [ ] inflammation	  ENMT: [ ] discharge [ ] thrush [ ] ulcers [ ] exudates  Respiratory: [X] dyspnea [ ] hemoptysis [ ] cough [ ] sputum	  Cardiovascular:  [X] chest pain [X] palpitations [ ] edema	  Gastrointestinal:  [X] nausea [ ] vomiting [ ] diarrhea [ ] constipation [ ] pain	  Genitourinary:  [ ] dysuria [ ] frequency [ ] hematuria [ ] discharge [ ] flank pain  Musculoskeletal:  [ ] myalgias [ ] arthralgias [ ] arthritis	  Neurological:  [ ] headache [ ] seizures [X] Dizziness	  Psychiatric:  [ ] anxiety [ ] depression	  Hematology/Lymphatics:  [ ] lymphadenopathy  Endocrine:  [ ] adrenal [ ] thyroid  Allergic/Immunologic:	 [ ] transplant [ ] seasonal    Vital Signs Last 24 Hrs  T(F): 98.3 (10-23-17 @ 10:57), Max: 98.3 (10-23-17 @ 10:57)    Vital Signs Last 24 Hrs  HR: 73 (10-23-17 @ 10:57) (53 - 74)  BP: 116/71 (10-23-17 @ 10:57) (115/60 - 124/82)  RR: 16 (10-23-17 @ 10:57)  SpO2: 98% (10-23-17 @ 10:57) (97% - 100%)  Wt(kg): --    PHYSICAL EXAM:  General: non-toxic, no acute distress , resting in bed  HEAD/EYES: anicteric, PERRL  ENT:  supple  Cardiovascular:   S1, S2   Respiratory:  clear bilaterally  GI:  soft, non-tender, normal bowel sounds  :  no CVA tenderness   Musculoskeletal:  no synovitis  Neurologic:  grossly non-focal, R. side droop and unable to close eye  Skin:  no rash  Lymph: no lymphadenopathy  Psychiatric:  appropriate affect  Vascular:  no phlebitis                                10.3   5.63  )-----------( 211      ( 23 Oct 2017 05:30 )             32.1       10-23    139  |  102  |  12  ----------------------------<  80  4.4   |  25  |  1.05    Ca    8.9      23 Oct 2017 05:30  Phos  3.9     10-23  Mg     1.9     10-23    TPro  6.3  /  Alb  3.5  /  TBili  0.4  /  DBili  x   /  AST  14  /  ALT  18  /  AlkPhos  62  10-23          MICROBIOLOGY:  Lyme IgG IgM pending        RADIOLOGY/EKG:  CXR - Clear  CTA - Negative for PE  EKG- Mobitz Type 1 HB  Echo - LV function normal, no pericardial effusion HPI:  28 y/o F w/ PMHx of Pericarditis dx 2016, Mobitz type I AV block, prior negative workup for Lyme dz (2016), recently dx w/ Bell's Palsy 1 wk ago on prednisone 60mg qd (total 2 days) and acyclovir 800mg TID presents with n/v and Chest pain (on 3 different occasions since Dec 2016). Patient reports waking up on midnight PTA with subjective fever that resolved and Motrin. She woke up in the morning at 8AM with intermittent chest pain that is midsternal and sharp. It is 8/10, non radiating and last 2 minutes and worsens with deep breath She was diagnosed with Rt sided Bell's palsy 1 week ago due to right face pain, HA and rt facial droop. She started to develop nausea/vomiting with acyclovir 3 days after starting taking it. No recent traveling outside [place], abdominal pain, chill, dysuria, SOB, palpitation. Pt. returned from [place] to [place] this past august.    In the ED, T 98, HR 74, bP 116/72, RR 18, Sat 100RA. Patient received IV tylenol, pepcid 20mg IV, Ketorolac 15mg IVP, reglan 10mg IVP, zofran 4mg IVP x2. Patient was monitored in CDU and n/v is now resolving. (23 Oct 2017 03:07) Cardiac Enzymes negative x2, CTA (-) for PE, CXR clear, EKG notable for Mobitz Type I HR. Echo: LV normal, no effusion. Cards has low suspicion for pericarditis.     Pt. seen and examined at bedside. Pt. still has mild 6/10 non radiating, non-positional midsternal CP associated with mild SOB, nausea, vertigo, and right sided facial soreness.     ID- born [facility]. No known tick exposure.  [place] No camping.  No joint complaints.  Reports had PPD tests in past- tested negative.      PAST MEDICAL & SURGICAL HISTORY:  Pre-eclampsia during second pregnancy  Pericarditis  S/P  section: x1      Allergies  meat (beef) (Swelling)  No Known Drug Allergies        ANTIMICROBIALS:  acyclovir   Tablet 800 three times a day      OTHER MEDS: MEDICATIONS  (STANDING):  ibuprofen  Tablet 400 every 6 hours PRN  meclizine 12.5 three times a day PRN  ondansetron Injectable 6 every 8 hours PRN      SOCIAL HISTORY:  [ ] etoh [X] tobacco 3month hx [ ] former smoker [ ] IVDU   Has 2 children, single mother    FAMILY HISTORY:  No pertinent family history in first degree relatives      REVIEW OF SYSTEMS  [  ] ROS unobtainable because:    [X] All other systems negative except as noted below:	    Constitutional:  [ ] fever [ ] weight loss  Skin:  [ ] rash [ ] phlebitis	  Eyes: [ ] icterus [ ] inflammation	  ENMT: [ ] discharge [ ] thrush [ ] ulcers [ ] exudates  Respiratory: [X] dyspnea [ ] hemoptysis [ ] cough [ ] sputum	  Cardiovascular:  [X] chest pain [X] palpitations [ ] edema	  Gastrointestinal:  [X] nausea [ ] vomiting [ ] diarrhea [ ] constipation [ ] pain	  Genitourinary:  [ ] dysuria [ ] frequency [ ] hematuria [ ] discharge [ ] flank pain  Musculoskeletal:  [ ] myalgias [ ] arthralgias [ ] arthritis	  Neurological:  [ ] headache [ ] seizures [X] Dizziness	  Psychiatric:  [ ] anxiety [ ] depression	  Hematology/Lymphatics:  [ ] lymphadenopathy  Endocrine:  [ ] adrenal [ ] thyroid  Allergic/Immunologic:	 [ ] transplant [ ] seasonal    Vital Signs Last 24 Hrs  T(F): 98.3 (10-23-17 @ 10:57), Max: 98.3 (10-23-17 @ 10:57)    Vital Signs Last 24 Hrs  HR: 73 (10-23-17 @ 10:57) (53 - 74)  BP: 116/71 (10-23-17 @ 10:57) (115/60 - 124/82)  RR: 16 (10-23-17 @ 10:57)  SpO2: 98% (10-23-17 @ 10:57) (97% - 100%)  Wt(kg): --    PHYSICAL EXAM:  General: non-toxic, no acute distress , resting in bed  HEAD/EYES: anicteric, PERRL  ENT:  supple  Cardiovascular:   S1, S2   Respiratory:  clear bilaterally  GI:  soft, non-tender, normal bowel sounds  :  no CVA tenderness   Musculoskeletal:  no synovitis  Neurologic:  grossly non-focal, R. side droop and unable to close eye  Skin:  no rash  Lymph: no lymphadenopathy  Psychiatric:  appropriate affect  Vascular:  no phlebitis                                10.3   5.63  )-----------( 211      ( 23 Oct 2017 05:30 )             32.1       10    139  |  102  |  12  ----------------------------<  80  4.4   |  25  |  1.05    Ca    8.9      23 Oct 2017 05:30  Phos  3.9     10-23  Mg     1.9     10-23    TPro  6.3  /  Alb  3.5  /  TBili  0.4  /  DBili  x   /  AST  14  /  ALT  18  /  AlkPhos  62  10-23          MICROBIOLOGY:  Lyme IgG IgM pending        RADIOLOGY/EKG:  CXR - Clear  CTA - Negative for PE  EKG- Mobitz Type 1 HB  Echo - LV function normal, no pericardial effusion

## 2017-10-23 NOTE — H&P ADULT - NSHPPHYSICALEXAM_GEN_ALL_CORE
PHYSICAL EXAM:  GENERAL: NAD, well-groomed, well-developed  HEAD:  Atraumatic, Normocephalic,   EYES: EOMI, PERRLA, conjunctiva and sclera clear  ENMT: No tonsillar erythema, exudates, or enlargement; Moist mucous membranes,   NECK: Supple, No JVD, Normal thyroid  HEART: Regular rate and rhythm; No murmurs, rubs, or gallops or muffled heart sound  RESPIRATORY: CTA B/L, No W/R/R  ABDOMEN: Soft, Nontender, Nondistended; Bowel sounds present  NEUROLOGY: A&Ox3, sensation intact, +rt facial drop, +inability to raise right eyebrow  EXTREMITIES:  2+ Peripheral Pulses, No clubbing, cyanosis, or edema  SKIN: warm, dry, normal color, no rash or abnormal lesions

## 2017-10-23 NOTE — CONSULT NOTE ADULT - PROBLEM SELECTOR RECOMMENDATION 9
Pt. has Hx of pericarditis, now has 1x week hx of bell's palsy and Mobitz type 1 AV block - low suspicion of pericarditis  -will follow up lyme serology to r/o lyme; c/w with acyclovir Pt. has Hx of pericarditis, now has 1x week hx of bell's palsy and Mobitz type 1 AV block - low suspicion of Lyme carditis  -will follow up lyme serology to r/o lyme; c/w with acyclovir Pt. has Hx of pericarditis, now has 1x week hx of bell's palsy and Mobitz type 1 AV block - low suspicion of Lyme carditis  -will follow up lyme serology to r/o lyme; can change acyclovir to valtrex 500 mg po BID x 5 days for bell's palsy.

## 2017-10-23 NOTE — H&P ADULT - HISTORY OF PRESENT ILLNESS
26 y/o F w/ PMHx of Pericarditis dx 2016, Mobitz type I AV block, prior negative workup for Lyme dz, recently dx w/ Bell's Palsy 1 wk ago on prednisone and acyclovir presents with n/v and cp. Patient reports waking up on midnight PTA feeling feverish and temp checked at home was 101. Patient woke up in the morning at 8AM with intermittent chest pain that is midsternal and sharp. It is 8/10 and nonradiating. She was diagnosed with Rt sided Bell's palsy       Pt also reports decreased PO intake secondary to nausea and vomiting x3days. No abdominal pain, no leg edema, no pain, no rashes, no other complaints. Pt returned from [place] 2017. Followed up w/ her PMD, Dr. Winslow( [phone]) yesterday who advised her to continue current [facility] tx if she can tolerate GI symptoms. LMP x1wk ago. No smoking, EtOH use, or drug use. Past surgical h/o . Of note pt is accompanied by 2 young children, and advised that an adult needs to come in and watch children while pt is getting ER workup. Pt states her mother will come. 28 y/o F w/ PMHx of Pericarditis dx 2016, Mobitz type I AV block, prior negative workup for Lyme dz, recently dx w/ Bell's Palsy 1 wk ago on prednisone and acyclovir presents with n/v and cp. Patient reports waking up on midnight PTA feeling feverish and woke up in the morning at 8AM with intermittent chest pain that is midsternal and sharp. It is 8/10, nonradiating and last 2 minutes. She was diagnosed with Rt sided Bell's palsy 1 day ago due to right face pain, HA and rt facial droop. She started to develop nausea/vomiting with acyclovir 3 days after starting taking it. No recent traveling, abdominal pain, chill, dysuria, SOB, palpitation.     In the ED, T 98, HR 74, bP 116/72, RR 18, Sat 100RA. Patient received IV tylenol, pepcid 20mg IV, Ketorolac 15mg IVP, reglan 10mg IVP, zofran 4mg IVP x2. Patient was monitored in CDU and n/v is now resolving. 28 y/o F w/ PMHx of Pericarditis dx 2016, Mobitz type I AV block, prior negative workup for Lyme dz, recently dx w/ Bell's Palsy 1 wk ago on prednisone and acyclovir presents with n/v and cp. Patient reports waking up on midnight PTA with subjective fever that resolved and motrin. She woke up in the morning at 8AM with intermittent chest pain that is midsternal and sharp. It is 8/10, nonradiating and last 2 minutes and worsens with deep breath She was diagnosed with Rt sided Bell's palsy 1 day ago due to right face pain, HA and rt facial droop. She started to develop nausea/vomiting with acyclovir 3 days after starting taking it. No recent traveling, abdominal pain, chill, dysuria, SOB, palpitation.     In the ED, T 98, HR 74, bP 116/72, RR 18, Sat 100RA. Patient received IV tylenol, pepcid 20mg IV, Ketorolac 15mg IVP, reglan 10mg IVP, zofran 4mg IVP x2. Patient was monitored in CDU and n/v is now resolving.

## 2017-10-23 NOTE — H&P ADULT - PROBLEM SELECTOR PLAN 1
-pleuritic chest pain with subjective fever indicative of possible pericarditis  -patient is current CP free  -per cardiology, will monitor off colchicine   -TELE monitor  -CE neg x2, trend CE PRN chest pain  -f/u cardiology -pleuritic chest pain with subjective fever indicative of possible pericarditis  -patient is current CP free and CTA neg for PE  -per cardiology, will monitor off colchicine   -TELE monitor  -CE neg x2, trend CE PRN chest pain  -f/u cardiology  -check TSH, lipid profile, ESR, CRP, HIV  -TTE to evaluate cardiac and valvular function

## 2017-10-24 DIAGNOSIS — I45.9 CONDUCTION DISORDER, UNSPECIFIED: ICD-10-CM

## 2017-10-24 DIAGNOSIS — I31.9 DISEASE OF PERICARDIUM, UNSPECIFIED: ICD-10-CM

## 2017-10-24 LAB
B BURGDOR C6 AB SER-ACNC: NEGATIVE — SIGNIFICANT CHANGE UP
B BURGDOR IGG+IGM SER-ACNC: 0.28 INDEX — SIGNIFICANT CHANGE UP (ref 0.01–0.89)
BUN SERPL-MCNC: 13 MG/DL — SIGNIFICANT CHANGE UP (ref 7–23)
CALCIUM SERPL-MCNC: 8.7 MG/DL — SIGNIFICANT CHANGE UP (ref 8.4–10.5)
CHLORIDE SERPL-SCNC: 104 MMOL/L — SIGNIFICANT CHANGE UP (ref 98–107)
CK SERPL-CCNC: 58 U/L — SIGNIFICANT CHANGE UP (ref 25–170)
CO2 SERPL-SCNC: 24 MMOL/L — SIGNIFICANT CHANGE UP (ref 22–31)
CREAT SERPL-MCNC: 0.94 MG/DL — SIGNIFICANT CHANGE UP (ref 0.5–1.3)
GLUCOSE SERPL-MCNC: 83 MG/DL — SIGNIFICANT CHANGE UP (ref 70–99)
HCT VFR BLD CALC: 35 % — SIGNIFICANT CHANGE UP (ref 34.5–45)
HGB BLD-MCNC: 11.4 G/DL — LOW (ref 11.5–15.5)
MCHC RBC-ENTMCNC: 28.9 PG — SIGNIFICANT CHANGE UP (ref 27–34)
MCHC RBC-ENTMCNC: 32.6 % — SIGNIFICANT CHANGE UP (ref 32–36)
MCV RBC AUTO: 88.6 FL — SIGNIFICANT CHANGE UP (ref 80–100)
NRBC # FLD: 0 — SIGNIFICANT CHANGE UP
PLATELET # BLD AUTO: 224 K/UL — SIGNIFICANT CHANGE UP (ref 150–400)
PMV BLD: 10.5 FL — SIGNIFICANT CHANGE UP (ref 7–13)
POTASSIUM SERPL-MCNC: 4.4 MMOL/L — SIGNIFICANT CHANGE UP (ref 3.5–5.3)
POTASSIUM SERPL-SCNC: 4.4 MMOL/L — SIGNIFICANT CHANGE UP (ref 3.5–5.3)
RBC # BLD: 3.95 M/UL — SIGNIFICANT CHANGE UP (ref 3.8–5.2)
RBC # FLD: 13.3 % — SIGNIFICANT CHANGE UP (ref 10.3–14.5)
SODIUM SERPL-SCNC: 142 MMOL/L — SIGNIFICANT CHANGE UP (ref 135–145)
TROPONIN T SERPL-MCNC: < 0.06 NG/ML — SIGNIFICANT CHANGE UP (ref 0–0.06)
WBC # BLD: 5.6 K/UL — SIGNIFICANT CHANGE UP (ref 3.8–10.5)
WBC # FLD AUTO: 5.6 K/UL — SIGNIFICANT CHANGE UP (ref 3.8–10.5)

## 2017-10-24 PROCEDURE — 93010 ELECTROCARDIOGRAM REPORT: CPT

## 2017-10-24 PROCEDURE — 99232 SBSQ HOSP IP/OBS MODERATE 35: CPT

## 2017-10-24 PROCEDURE — 99233 SBSQ HOSP IP/OBS HIGH 50: CPT

## 2017-10-24 RX ORDER — ACYCLOVIR SODIUM 500 MG
200 VIAL (EA) INTRAVENOUS
Qty: 0 | Refills: 0 | Status: DISCONTINUED | OUTPATIENT
Start: 2017-10-24 | End: 2017-10-24

## 2017-10-24 RX ORDER — VALACYCLOVIR 500 MG/1
500 TABLET, FILM COATED ORAL
Qty: 0 | Refills: 0 | Status: COMPLETED | OUTPATIENT
Start: 2017-10-24 | End: 2017-10-26

## 2017-10-24 RX ADMIN — Medication 800 MILLIGRAM(S): at 05:32

## 2017-10-24 RX ADMIN — Medication 800 MILLIGRAM(S): at 13:46

## 2017-10-24 RX ADMIN — Medication 800 MILLIGRAM(S): at 21:08

## 2017-10-24 RX ADMIN — Medication 30 MILLILITER(S): at 14:31

## 2017-10-24 NOTE — PROGRESS NOTE ADULT - SUBJECTIVE AND OBJECTIVE BOX
Follow Up:  Bell's palsy    Inverval History/ROS:  c/o pain right face/ cheek    Allergies  meat (Swelling)  No Known Drug Allergies        ANTIMICROBIALS:  acyclovir   Tablet 800 three times a day      OTHER MEDS:  aluminum hydroxide/magnesium hydroxide/simethicone Suspension 30 milliLiter(s) Oral once  ibuprofen  Tablet 400 milliGRAM(s) Oral every 6 hours PRN  meclizine 12.5 milliGRAM(s) Oral three times a day PRN  ondansetron Injectable 6 milliGRAM(s) IV Push every 8 hours PRN      Vital Signs Last 24 Hrs  T(C): 36.9 (24 Oct 2017 12:25), Max: 36.9 (24 Oct 2017 12:25)  T(F): 98.4 (24 Oct 2017 12:25), Max: 98.4 (24 Oct 2017 12:25)  HR: 75 (24 Oct 2017 12:25) (52 - 75)  BP: 120/86 (24 Oct 2017 12:25) (111/68 - 122/78)  BP(mean): --  RR: 18 (24 Oct 2017 12:25) (18 - 18)  SpO2: 99% (24 Oct 2017 12:25) (98% - 100%)    PHYSICAL EXAM:  General: non-toxic  HEAD/EYES:   white sclera   ENT:  right facial palsy  Cardiovascular:    normal   Respiratory:  clear to ausculation bilaterally  GI:   soft, non-tender, normal bowel sounds  :  no castellanos   Musculoskeletal:   no synovitis  Neurologic: right facial palsy  Skin:   no rash  Lymph:  no lymphadenopathy  Psychiatric:   appropriate affect  alert & oriented  Lines:   no phlebitis                                11.4   5.60  )-----------( 224      ( 24 Oct 2017 06:10 )             35.0       10-24    142  |  104  |  13  ----------------------------<  83  4.4   |  24  |  0.94    Ca    8.7      24 Oct 2017 06:10  Phos  3.9     10-23  Mg     1.9     10-23    TPro  6.3  /  Alb  3.5  /  TBili  0.4  /  DBili  x   /  AST  14  /  ALT  18  /  AlkPhos  62  10-23          MICROBIOLOGY:  HIV neg  Lyme ALLIE testing      RADIOLOGY:  < from: CT Head No Cont (10.23.17 @ 15:27) >  FINDINGS:  Ventricles and sulci are within normal limits. There are no areas of   attenuation abnormality within the brain parenchyma. There is no   intraparenchymal hematoma, mass effect or midline shift. No abnormal   extra-axial fluid collections are present. There is no evidence of acute   transcortical territorial infarction.    The calvarium is intact. The visualized intraorbital compartments,   paranasal sinuses and tympanomastoid cavities appear free of acute   disease.      IMPRESSION:  No acute intracranial hemorrhage.  Noncontrast CT head imaging is within normal limits.    < end of copied text >

## 2017-10-24 NOTE — PROGRESS NOTE ADULT - SUBJECTIVE AND OBJECTIVE BOX
Patient is a 27y old  Female who presents with a chief complaint of nausea, vomiting, cp (23 Oct 2017 03:07)      SUBJECTIVE / OVERNIGHT EVENTS: Improved CP. She endorsed lethargy    MEDICATIONS  (STANDING):  acyclovir   Tablet 800 milliGRAM(s) Oral three times a day    MEDICATIONS  (PRN):  ibuprofen  Tablet 400 milliGRAM(s) Oral every 6 hours PRN pain  meclizine 12.5 milliGRAM(s) Oral three times a day PRN Dizziness  ondansetron Injectable 6 milliGRAM(s) IV Push every 8 hours PRN Nausea and/or Vomiting      T(C): 36.6 (10-24-17 @ 05:36), Max: 36.7 (10-23-17 @ 14:43)  HR: 66 (10-24-17 @ 05:36) (52 - 69)  BP: 113/71 (10-24-17 @ 05:36) (111/68 - 122/78)  RR: 18 (10-24-17 @ 05:36) (18 - 18)  SpO2: 99% (10-24-17 @ 05:36) (98% - 100%)  CAPILLARY BLOOD GLUCOSE        I&O's Summary      PHYSICAL EXAM:  GENERAL: NAD,   HEAD:  Normocephalic  EYES: EOMI,  conjunctiva and sclera clear  NECK: Supple,   CHEST/LUNG: Clear to auscultation bilaterally; No wheeze  HEART:  s1 s2 + Regular rate and rhythm;   ABDOMEN: Soft, Nontender, Nondistended; Bowel sounds present  EXTREMITIES:   No clubbing, cyanosis  NEURO: AAOx3      LABS:                        11.4   5.60  )-----------( 224      ( 24 Oct 2017 06:10 )             35.0     10-24    142  |  104  |  13  ----------------------------<  83  4.4   |  24  |  0.94    Ca    8.7      24 Oct 2017 06:10  Phos  3.9     10-23  Mg     1.9     10-23    TPro  6.3  /  Alb  3.5  /  TBili  0.4  /  DBili  x   /  AST  14  /  ALT  18  /  AlkPhos  62  10-23      CARDIAC MARKERS ( 22 Oct 2017 14:20 )  x     / < 0.06 ng/mL / 63 u/L / 1.00 ng/mL / x              Tele: 1st degree block, 2nd degree Block    Consultant(s) Notes Reviewed:  Cardiology

## 2017-10-24 NOTE — PROGRESS NOTE ADULT - SUBJECTIVE AND OBJECTIVE BOX
Patient seen and examined at bedside on 4N 421A    Overnight Events: No events overnight    Review Of Systems: No chest pain, shortness of breath, or palpitations            Medications:  acyclovir   Tablet 800 milliGRAM(s) Oral three times a day  ibuprofen  Tablet 400 milliGRAM(s) Oral every 6 hours PRN  meclizine 12.5 milliGRAM(s) Oral three times a day PRN  ondansetron Injectable 6 milliGRAM(s) IV Push every 8 hours PRN    PAST MEDICAL & SURGICAL HISTORY:  Pre-eclampsia during second pregnancy  Pericarditis  S/P  section: x1    Vitals:  T(F): 97.9 (10-), Max: 98.3 (10-)  HR: 66 (10-) (52 - 73)  BP: 113/71 (10-) (111/68 - 124/82)  RR: 18 (10-24)  SpO2: 99% (10-)    Physical Exam:  Appearance: No acute distress; well appearing  Eyes: PERRL, EOMI, pink conjunctiva  HENT: Normal oral muscosa  Cardiovascular: RRR, S1, S2, no murmurs, rubs, or gallops; no edema; no JVD  Respiratory: Clear to auscultation bilaterally  Gastrointestinal: soft, non-tender, non-distended with normal bowel sounds  Musculoskeletal: No clubbing; no joint deformity   Neurologic: Non-focal  Lymphatic: No lymphadenopathy  Psychiatry: AAOx3, mood & affect appropriate  Skin: No rashes, ecchymoses, or cyanosis                          11.4   5.60  )-----------( 224                  35.0     142  |  104  |  13  ----------------------------<  83  4.4   |  24  |  0.94    Ca    8.7      Phos  3.9       Mg     1.9        TPro  6.3  /  Alb  3.5  /  TBili  0.4  /  DBili  x   /  AST  14  /  ALT  18  /  AlkPhos  62  10-23    PT/INR - ( 22 Oct 2017 10:20 )   PT: 11.4 SEC;   INR: 1.02     PTT - ( 22 Oct 2017 10:20 )  PTT:29.1 SEC    CARDIAC MARKERS ( 22 Oct 2017 14:20 )  x     / < 0.06 ng/mL / 63 u/L / 1.00 ng/mL / x      CARDIAC MARKERS ( 22 Oct 2017 10:20 )  x     / < 0.06 ng/mL / 66 u/L / 1.00 ng/mL / x        HIV: neg, TSH:0.89    ECG(s): Personally reviewed: Sinus w. 1st degree AVB and Mobitz 1    Echo: < from: Transthoracic Echocardiogram (16 @ 07:36) >  DIMENSIONS:  Dimensions:     Normal Values:  LA:     3.5 cm    2.0 - 4.0 cm  Ao:     2.7 cm    2.0 - 3.8 cm  SEPTUM: 0.8 cm    0.6 - 1.2 cm  PWT:    0.8 cm    0.6 - 1.1 cm  LVIDd:  5.2 cm    3.0 - 5.6 cm  LVIDs:  3.3 cm    1.8 - 4.0 cm  Derived Variables:  LVMI: 86 g/m2  RWT: 0.30  Fractional short: 37 %  Ejection Fraction (Teicholtz): 66 %  ------------------------------------------------------------------------  OBSERVATIONS: Mitral Valve: Normal mitral valve. Mild mitral regurgitation. Aortic Root: Normal aortic root. Aortic Valve: Normal trileaflet aortic valve. Left Atrium: Normal left atrium. Left Ventricle: Endocardium not well visualized; grossly normal left ventricular systolic function. Normal left ventricular internal dimensions and wall thicknesses. Normal left ventricular diastolic function. Right Heart: Normal right atrium. Unable to accurately evaluate right ventricular size or systolic function. Normal tricuspid valve.  Minimal tricuspid regurgitation. Normal pulmonic valve.  Mild pulmonic regurgitation. Pericardium/PleuraNormal pericardium with no pericardial effusion. CONCLUSIONS: 1. Normal mitral valve. Mild mitral regurgitation. 2. Normal left ventricular internal dimensions and wall thicknesses. 3. Endocardium not well visualized; grossly normal left ventricular systolic function. 4. Normal left ventricular diastolic function. 5. Unable to accurately evaluate right ventricular size or systolic function.  < end of copied text >    Imaging: CTH: IMPRESSION: No acute intracranial hemorrhage. Noncontrast CT head imaging is within normal limits.    Interpretation of Telemetry: 2:1 HB w/ narrow ventricular escape 38 Patient seen and examined at bedside on 4N 421A    Overnight Events: No events overnight    Review Of Systems: Pt reports episodes of 7/10 pleuritic CP, not worse with position. Reports palpitations at 9pm last night (not tachy on Tele at that time). She also complains of dizziness - not room spinning but feeling like she needs to stop what she is doing to collect herself. She was not straining when this symptom occured            Medications:  acyclovir   Tablet 800 milliGRAM(s) Oral three times a day  ibuprofen  Tablet 400 milliGRAM(s) Oral every 6 hours PRN  meclizine 12.5 milliGRAM(s) Oral three times a day PRN  ondansetron Injectable 6 milliGRAM(s) IV Push every 8 hours PRN    PAST MEDICAL & SURGICAL HISTORY:  Pre-eclampsia during second pregnancy  Pericarditis (2016)  S/P  section: x1    Vitals:  T(F): 97.9 (10-24), Max: 98.3 (10-23)  HR: 66 (10-24) (52 - 73)  BP: 113/71 (10-24) (111/68 - 124/82)  RR: 18 (10-24)  SpO2: 99% (10-24)    Physical Exam:  Appearance: No acute distress; well appearing  Eyes: PERRL, EOMI, pink conjunctiva  HENT: Normal oral muscosa  Cardiovascular: RRR, S1, S2, no murmurs, or gallops; no edema; no JVD, could not appreciate rub  Respiratory: Clear to auscultation bilaterally  Gastrointestinal: soft, non-tender, non-distended with normal bowel sounds, obese  Musculoskeletal: No clubbing; no joint deformity   Neurologic: R sided Bell's  Lymphatic: No lymphadenopathy  Psychiatry: AAOx3, mood & affect appropriate  Skin: No rashes, ecchymoses, or cyanosis               11.4   5.60  )-----------( 224                  35.0     142  |  104  |  13  ----------------------------<  83  4.4   |  24  |  0.94    Ca    8.7      Phos  3.9       Mg     1.9        TPro  6.3  /  Alb  3.5  /  TBili  0.4  /  DBili  x   /  AST  14  /  ALT  18  /  AlkPhos  62  10-23    PT/INR - ( 22 Oct 2017 10:20 )   PT: 11.4 SEC;   INR: 1.02     PTT - ( 22 Oct 2017 10:20 )  PTT:29.1 SEC    CARDIAC MARKERS ( 22 Oct 2017 14:20 )  x     / < 0.06 ng/mL / 63 u/L / 1.00 ng/mL / x      CARDIAC MARKERS ( 22 Oct 2017 10:20 )  x     / < 0.06 ng/mL / 66 u/L / 1.00 ng/mL / x        HIV: neg, TSH:0.89    ECG(s): Personally reviewed: Sinus w. 1st degree AVB and Mobitz 1    Echo: < from: Transthoracic Echocardiogram (16 @ 07:36) >  DIMENSIONS:  Dimensions:     Normal Values:  LA:     3.5 cm    2.0 - 4.0 cm  Ao:     2.7 cm    2.0 - 3.8 cm  SEPTUM: 0.8 cm    0.6 - 1.2 cm  PWT:    0.8 cm    0.6 - 1.1 cm  LVIDd:  5.2 cm    3.0 - 5.6 cm  LVIDs:  3.3 cm    1.8 - 4.0 cm  Derived Variables:  LVMI: 86 g/m2  RWT: 0.30  Fractional short: 37 %  Ejection Fraction (Teicholtz): 66 %  ------------------------------------------------------------------------  OBSERVATIONS: Mitral Valve: Normal mitral valve. Mild mitral regurgitation. Aortic Root: Normal aortic root. Aortic Valve: Normal trileaflet aortic valve. Left Atrium: Normal left atrium. Left Ventricle: Endocardium not well visualized; grossly normal left ventricular systolic function. Normal left ventricular internal dimensions and wall thicknesses. Normal left ventricular diastolic function. Right Heart: Normal right atrium. Unable to accurately evaluate right ventricular size or systolic function. Normal tricuspid valve.  Minimal tricuspid regurgitation. Normal pulmonic valve.  Mild pulmonic regurgitation. Pericardium/PleuraNormal pericardium with no pericardial effusion. CONCLUSIONS: 1. Normal mitral valve. Mild mitral regurgitation. 2. Normal left ventricular internal dimensions and wall thicknesses. 3. Endocardium not well visualized; grossly normal left ventricular systolic function. 4. Normal left ventricular diastolic function. 5. Unable to accurately evaluate right ventricular size or systolic function.  < end of copied text >    Imaging: CTH: IMPRESSION: No acute intracranial hemorrhage. Noncontrast CT head imaging is within normal limits.    Interpretation of Telemetry: 2:1 HB w/ narrow ventricular escape 38, sinus 60s-70s

## 2017-10-24 NOTE — CONSULT NOTE ADULT - SUBJECTIVE AND OBJECTIVE BOX
CHIEF COMPLAINT:     HISTORY OF PRESENT ILLNESS:    PAST MEDICAL & SURGICAL HISTORY:  Pre-eclampsia during second pregnancy  Pericarditis  S/P  section: x1          PREVIOUS DIAGNOSTIC TESTING:    Echocardiogram:   Catheterization:  Stress Test:  	    MEDICATIONS:    acyclovir   Tablet 800 milliGRAM(s) Oral three times a day      ibuprofen  Tablet 400 milliGRAM(s) Oral every 6 hours PRN  meclizine 12.5 milliGRAM(s) Oral three times a day PRN  ondansetron Injectable 6 milliGRAM(s) IV Push every 8 hours PRN            FAMILY HISTORY:  No pertinent family history in first degree relatives      SOCIAL HISTORY:      [ ] Smoker  [ ] Alcohol  [ ] Drugs    Allergies    meat (Swelling)  No Known Drug Allergies    Intolerances    	    REVIEW OF SYSTEMS:  CONSTITUTIONAL: No fever, weight loss, or fatigue  EYES: No eye pain, visual disturbances, or discharge  ENMT:  No difficulty hearing, tinnitus, vertigo; No sinus or throat pain  NECK: No pain or stiffness  RESPIRATORY: No cough, wheezing, chills or hemoptysis; No Shortness of Breath  CARDIOVASCULAR: No chest pain, palpitations, passing out, dizziness, or leg swelling  GASTROINTESTINAL: No abdominal or epigastric pain. No nausea, vomiting, or hematemesis; No diarrhea or constipation. No melena or hematochezia.  GENITOURINARY: No dysuria, frequency, hematuria, or incontinence  NEUROLOGICAL: No headaches, memory loss, loss of strength, numbness, or tremors  SKIN: No itching, burning, rashes, or lesions   LYMPH Nodes: No enlarged glands  ENDOCRINE: No heat or cold intolerance; No hair loss  MUSCULOSKELETAL: No joint pain or swelling; No muscle, back, or extremity pain  PSYCHIATRIC: No depression, anxiety, mood swings, or difficulty sleeping  HEME/LYMPH: No easy bruising, or bleeding gums  ALLERY AND IMMUNOLOGIC: No hives or eczema	    [ ] All others negative	  [ ] Unable to obtain    PHYSICAL EXAM:  T(C): 36.6 (10-24-17 @ 05:36), Max: 36.7 (10-23-17 @ 14:43)  HR: 66 (10-24-17 @ 05:36) (52 - 69)  BP: 113/71 (10-24-17 @ 05:36) (111/68 - 122/78)  RR: 18 (10-24-17 @ 05:36) (18 - 18)  SpO2: 99% (10-24-17 @ 05:36) (98% - 100%)  Wt(kg): --  I&O's Summary      Appearance: Normal	  HEENT:   Normal oral mucosa, PERRL, EOMI	  Lymphatic: No lymphadenopathy  Cardiovascular: Normal S1 S2, No JVD, No murmurs, No edema  Respiratory: Lungs clear to auscultation	  Psychiatry: A & O x 3, Mood & affect appropriate  Gastrointestinal:  Soft, Non-tender, + BS	  Skin: No rashes, No ecchymoses, No cyanosis	  Neurologic: Non-focal  Extremities: Normal range of motion, No clubbing, cyanosis or edema  Vascular: Peripheral pulses palpable 2+ bilaterally    TELEMETRY: 	    ECG:  	  RADIOLOGY:  OTHER: 	  	  LABS:	 	    CARDIAC MARKERS:                                  11.4   5.60  )-----------( 224      ( 24 Oct 2017 06:10 )             35.0     10-    142  |  104  |  13  ----------------------------<  83  4.4   |  24  |  0.94    Ca    8.7      24 Oct 2017 06:10  Phos  3.9     10-  Mg     1.9     10-23    TPro  6.3  /  Alb  3.5  /  TBili  0.4  /  DBili  x   /  AST  14  /  ALT  18  /  AlkPhos  62  10-23    proBNP:   Lipid Profile:   HgA1c:   TSH: CHIEF COMPLAINT: Called to evaluate patient with possible second degree type II AV block    HISTORY OF PRESENT ILLNESS:  26 y/o F w/ PMHx of Pericarditis dx 2016, Mobitz type I AV block, prior negative workup for Lyme dz, recently dx w/ Bell's Palsy 1 wk ago on prednisone and acyclovir presents with n/v and cp.  She woke up in the morning at 8AM with intermittent chest pain that is midsternal and sharp. She was diagnosed with Rt sided Bell's palsy 1 day ago due to right face pain, HA and rt facial droop.  She started to develop nausea/vomiting with acyclovir 3 days after starting taking it. EKG revealed first degree AV block, second degree AV block type I and blocked APCs. Telemetry reveals sinus rhythm with HR 50s-80s. Patient reports multiple hospital admissions related to sharp chest pain. She was treated for pericarditis in the past. Also reports episode of syncope associated with chest pain in 2017 likely vagal. Currently patient denies chest pain, SOB, or palpitations. Has c/o intermittent dizziness.   PAST MEDICAL & SURGICAL HISTORY:  Pre-eclampsia during second pregnancy  Pericarditis  S/P  section: x1    PREVIOUS DIAGNOSTIC TESTING:    Echocardiogram:10/23/2017: DIMENSIONS:  Dimensions:     Normal Values:  LA:     3.5 cm    2.0 - 4.0 cm  Ao:     2.6 cm    2.0 - 3.8 cm  SEPTUM: 0.7 cm    0.6 - 1.2 cm  PWT:    0.8 cm    0.6 - 1.1 cm  LVIDd:  5.2 cm    3.0 - 5.6 cm  LVIDs:  3.4 cm    1.8 - 4.0 cm  Derived Variables:  LVMI: 63 g/m2  RWT: 0.30  Fractional short: 35 %  Ejection Fraction (Teicholtz): 63 %  ------------------------------------------------------------------------  OBSERVATIONS:  Mitral Valve: Normal mitral valve. Mild mitral  regurgitation.  Aortic Root: Normal aortic root.  Aortic Valve: Normal trileaflet aortic valve.  Left Atrium: Normal left atrium.  Left Ventricle: Normal left ventricular systolic function.  No segmental wall motion abnormalities.  Endocardial  visualization enhanced with intravenous injection of echo  contrast (Definity). Normal left ventricular internal  dimensions and wall thicknesses.  Right Heart: Normal right atrium. Normal right ventricular  size and function. Normal tricuspid valve.  Minimal  tricuspid regurgitation. Normal pulmonic valve.  Mild  pulmonic regurgitation.  Pericardium/PleuraNormal pericardium with no pericardial  effusion.  ------------------------------------------------------------------------  CONCLUSIONS:  1. Normal mitral valve. Mild mitral regurgitation.  2. Normal left ventricular internal dimensions and wall  thicknesses.  3. Normal left ventricular systolic function. No segmental  wall motion abnormalities.  Endocardial visualization  enhanced with intravenous injection of echo contrast  (Definity).  4. Normal right ventricular size and function.    	    MEDICATIONS:    acyclovir   Tablet 800 milliGRAM(s) Oral three times a day      ibuprofen  Tablet 400 milliGRAM(s) Oral every 6 hours PRN  meclizine 12.5 milliGRAM(s) Oral three times a day PRN  ondansetron Injectable 6 milliGRAM(s) IV Push every 8 hours PRN      FAMILY HISTORY:  No pertinent family history in first degree relatives      SOCIAL HISTORY:    Lives in the shelter with her two children    Former Smoker  [-] Alcohol  [-] Drugs    Allergies    meat (Swelling)  No Known Drug Allergies    Intolerances    	    REVIEW OF SYSTEMS:  CONSTITUTIONAL: No fever, weight loss, or fatigue  EYES: No eye pain, visual disturbances, or discharge  ENMT:  No difficulty hearing, tinnitus, vertigo; No sinus or throat pain  NECK: No pain or stiffness  RESPIRATORY: No cough, wheezing, chills or hemoptysis; No Shortness of Breath  CARDIOVASCULAR: No palpitations, passing out, dizziness, or leg swelling, + chest pain,  GASTROINTESTINAL: No abdominal or epigastric pain. No nausea, vomiting, or hematemesis; No diarrhea or constipation. No melena or hematochezia.  GENITOURINARY: No dysuria, frequency, hematuria, or incontinence  NEUROLOGICAL: No headaches, memory loss, loss of strength, numbness, or tremors  SKIN: No itching, burning, rashes, or lesions   LYMPH Nodes: No enlarged glands  ENDOCRINE: No heat or cold intolerance; No hair loss  MUSCULOSKELETAL: No joint pain or swelling; No muscle, back, or extremity pain  PSYCHIATRIC: No depression, anxiety, mood swings, or difficulty sleeping  HEME/LYMPH: No easy bruising, or bleeding gums  ALLERY AND IMMUNOLOGIC: No hives or eczema	    [X] All others negative	  [ ] Unable to obtain    PHYSICAL EXAM:  T(C): 36.6 (10-24-17 @ 05:36), Max: 36.7 (10-23-17 @ 14:43)  HR: 66 (10-24-17 @ 05:36) (52 - 69)  BP: 113/71 (10-24-17 @ 05:36) (111/68 - 122/78)  RR: 18 (10-24-17 @ 05:36) (18 - 18)  SpO2: 99% (10-24-17 @ 05:36) (98% - 100%)  Wt(kg): --  I&O's Summary      Appearance: Normal	  HEENT:   Normal oral mucosa, PERRL, EOMI	  Lymphatic: No lymphadenopathy  Cardiovascular: Normal S1 S2, No JVD, No murmurs, No edema  Respiratory: Lungs clear to auscultation	  Psychiatry: A & O x 3, Mood & affect appropriate  Gastrointestinal:  Soft, Non-tender, + BS	  Skin: No rashes, No ecchymoses, No cyanosis	  Neurologic: Non-focal  Extremities: Normal range of motion, No clubbing, cyanosis or edema  Vascular: Peripheral pulses palpable 2+ bilaterally    TELEMETRY: Sinus rhythm with HR 80s-90s, SInus rhythm with first degree AV block, Winkeback and blocked APCS	    ECG:  	Sinus rhythm with HR 71 bpm, Sinus rhythm with first degree AV block and Winkebach  RADIOLOGY: CT PA: No pulmonary emboli  OTHER: 	    LABS:	 	    CARDIAC MARKERS:                   11.4   5.60  )-----------( 224      ( 24 Oct 2017 06:10 )             35.0     10-    142  |  104  |  13  ----------------------------<  83  4.4   |  24  |  0.94    Ca    8.7      24 Oct 2017 06:10  Phos  3.9     10-23  Mg     1.9     10-    TPro  6.3  /  Alb  3.5  /  TBili  0.4  /  DBili  x   /  AST  14  /  ALT  18  /  AlkPhos  62  10-23    TSH: 0.89

## 2017-10-24 NOTE — CONSULT NOTE ADULT - ASSESSMENT
27F w/ PMHx of Pericarditis (12/2016), recently Dx w/ Pitts's (was on Prednisone, now on Acyclovir) p/w pleuritic CP. Found to have AV block on tele- first degree AV block, Winkeback, blocked APCs and rare 2:1 AV block with narrow QRS. Patient is asymptomatic during the episodes.  Cardiology note indicated a low clinical suspicion for recurrent pericarditis. The AV block is likely vagally mediated. The dizziness unrelated to AV block as she has c/o dizziness when she is in sinus rhythm.   - No EP interventions at this time. Monitor and maintain electrolytes  - Monior for symptomatic high degree AV block  - Will follow up 27F w/ PMHx of Pericarditis (12/2016), recently Dx w/ Pitts's (was on Prednisone, now on Acyclovir) p/w pleuritic CP. Found to have AV block on tele- first degree AV block, Winkeback, and blocked APCs.  Patient is asymptomatic during the episodes.  Cardiology note indicated a low clinical suspicion for recurrent pericarditis. The AV block is likely vagally mediated. The dizziness unrelated to AV block as she has c/o dizziness when she is in sinus rhythm.   - No EP interventions at this time. Monitor and maintain electrolytes  - Monior for symptomatic high degree AV block  - Will follow up 27F w/ PMHx of Pericarditis (12/2016), recently Dx w/ Pitts's (was on Prednisone, now on Acyclovir) p/w pleuritic CP. Found to have AV block on tele- first degree AV block, Winkeback, and blocked APCs.  Patient is asymptomatic during the episodes.  Cardiology note indicated a low clinical suspicion for recurrent pericarditis. The AV block is likely vagally mediated. The dizziness unrelated to AV block as she has c/o dizziness when she is in sinus rhythm.   - No EP interventions at this time. Monitor and maintain electrolytes  - Monior on tele. Currently having AV maynor Wenckebach.  - Will follow up

## 2017-10-25 LAB
BASOPHILS # BLD AUTO: 0.04 K/UL — SIGNIFICANT CHANGE UP (ref 0–0.2)
BASOPHILS NFR BLD AUTO: 0.6 % — SIGNIFICANT CHANGE UP (ref 0–2)
BUN SERPL-MCNC: 10 MG/DL — SIGNIFICANT CHANGE UP (ref 7–23)
CALCIUM SERPL-MCNC: 8.8 MG/DL — SIGNIFICANT CHANGE UP (ref 8.4–10.5)
CHLORIDE SERPL-SCNC: 102 MMOL/L — SIGNIFICANT CHANGE UP (ref 98–107)
CO2 SERPL-SCNC: 24 MMOL/L — SIGNIFICANT CHANGE UP (ref 22–31)
CREAT SERPL-MCNC: 0.87 MG/DL — SIGNIFICANT CHANGE UP (ref 0.5–1.3)
EOSINOPHIL # BLD AUTO: 0.11 K/UL — SIGNIFICANT CHANGE UP (ref 0–0.5)
EOSINOPHIL NFR BLD AUTO: 1.7 % — SIGNIFICANT CHANGE UP (ref 0–6)
GLUCOSE SERPL-MCNC: 79 MG/DL — SIGNIFICANT CHANGE UP (ref 70–99)
HCT VFR BLD CALC: 35.7 % — SIGNIFICANT CHANGE UP (ref 34.5–45)
HGB BLD-MCNC: 11.5 G/DL — SIGNIFICANT CHANGE UP (ref 11.5–15.5)
IMM GRANULOCYTES # BLD AUTO: 0.03 # — SIGNIFICANT CHANGE UP
IMM GRANULOCYTES NFR BLD AUTO: 0.5 % — SIGNIFICANT CHANGE UP (ref 0–1.5)
LYMPHOCYTES # BLD AUTO: 2.73 K/UL — SIGNIFICANT CHANGE UP (ref 1–3.3)
LYMPHOCYTES # BLD AUTO: 41.3 % — SIGNIFICANT CHANGE UP (ref 13–44)
MAGNESIUM SERPL-MCNC: 1.9 MG/DL — SIGNIFICANT CHANGE UP (ref 1.6–2.6)
MCHC RBC-ENTMCNC: 28.4 PG — SIGNIFICANT CHANGE UP (ref 27–34)
MCHC RBC-ENTMCNC: 32.2 % — SIGNIFICANT CHANGE UP (ref 32–36)
MCV RBC AUTO: 88.1 FL — SIGNIFICANT CHANGE UP (ref 80–100)
MONOCYTES # BLD AUTO: 0.67 K/UL — SIGNIFICANT CHANGE UP (ref 0–0.9)
MONOCYTES NFR BLD AUTO: 10.1 % — SIGNIFICANT CHANGE UP (ref 2–14)
NEUTROPHILS # BLD AUTO: 3.03 K/UL — SIGNIFICANT CHANGE UP (ref 1.8–7.4)
NEUTROPHILS NFR BLD AUTO: 45.8 % — SIGNIFICANT CHANGE UP (ref 43–77)
NRBC # FLD: 0 — SIGNIFICANT CHANGE UP
PLATELET # BLD AUTO: 210 K/UL — SIGNIFICANT CHANGE UP (ref 150–400)
PMV BLD: 10.2 FL — SIGNIFICANT CHANGE UP (ref 7–13)
POTASSIUM SERPL-MCNC: 4.4 MMOL/L — SIGNIFICANT CHANGE UP (ref 3.5–5.3)
POTASSIUM SERPL-SCNC: 4.4 MMOL/L — SIGNIFICANT CHANGE UP (ref 3.5–5.3)
RBC # BLD: 4.05 M/UL — SIGNIFICANT CHANGE UP (ref 3.8–5.2)
RBC # FLD: 13.3 % — SIGNIFICANT CHANGE UP (ref 10.3–14.5)
SODIUM SERPL-SCNC: 137 MMOL/L — SIGNIFICANT CHANGE UP (ref 135–145)
WBC # BLD: 6.61 K/UL — SIGNIFICANT CHANGE UP (ref 3.8–10.5)
WBC # FLD AUTO: 6.61 K/UL — SIGNIFICANT CHANGE UP (ref 3.8–10.5)

## 2017-10-25 PROCEDURE — 99233 SBSQ HOSP IP/OBS HIGH 50: CPT

## 2017-10-25 PROCEDURE — 99232 SBSQ HOSP IP/OBS MODERATE 35: CPT

## 2017-10-25 RX ADMIN — VALACYCLOVIR 500 MILLIGRAM(S): 500 TABLET, FILM COATED ORAL at 05:28

## 2017-10-25 RX ADMIN — ONDANSETRON 6 MILLIGRAM(S): 8 TABLET, FILM COATED ORAL at 09:10

## 2017-10-25 RX ADMIN — VALACYCLOVIR 500 MILLIGRAM(S): 500 TABLET, FILM COATED ORAL at 17:25

## 2017-10-25 NOTE — PROGRESS NOTE ADULT - SUBJECTIVE AND OBJECTIVE BOX
Brief Update:     Patient's telemetry reviewed this AM. HR in the 60-80 range with underlying Mobitz I. Pt otherwise asymptomatic.  There are no plans for device on this admission as the patient.   If patient experiences symptoms of palps, dizziness, lightheadedness, or syncope, will have pt follow up with EP as outpatient. At this time no further EP intervention planned on this admission.     Case discussed with EP attending Dr. Leigh.     Dejon Calderon MD   Fellow  x [phone]

## 2017-10-25 NOTE — PROGRESS NOTE ADULT - SUBJECTIVE AND OBJECTIVE BOX
Follow Up:  Bell's palsy    Inverval History/ROS:  able to close right eye now.    Allergies  meat (Swelling)  No Known Drug Allergies        ANTIMICROBIALS:  valACYclovir 500 two times a day        OTHER MEDS:  aluminum hydroxide/magnesium hydroxide/simethicone Suspension 30 milliLiter(s) Oral once  ibuprofen  Tablet 400 milliGRAM(s) Oral every 6 hours PRN  meclizine 12.5 milliGRAM(s) Oral three times a day PRN  ondansetron Injectable 6 milliGRAM(s) IV Push every 8 hours PRN      Vital Signs Last 24 Hrs  T(F): 98.1 (10-25-17 @ 14:39), Max: 98.1 (10-25-17 @ 14:39)  HR: 67 (10-25-17 @ 14:39)  BP: 113/74 (10-25-17 @ 14:39)  RR: 18 (10-25-17 @ 14:39)  SpO2: 97% (10-25-17 @ 14:39) (97% - 100%)    PHYSICAL EXAM:  General: non-toxic  HEAD/EYES:   white sclera   ENT:  right facial palsy  Cardiovascular:    normal   Respiratory:  clear to ausculation bilaterally  GI:   soft, non-tender, normal bowel sounds  :  no castellanos   Musculoskeletal:   no synovitis  Neurologic: right facial palsy  Skin:   no rash  Lymph:  no lymphadenopathy  Psychiatric:   appropriate affect  alert & oriented  Lines:   no phlebitis                                           11.5   6.61  )-----------( 210      ( 25 Oct 2017 06:00 )             35.7 10-25    137  |  102  |  10  ----------------------------<  79  4.4   |  24  |  0.87  Ca    8.8      25 Oct 2017 06:00Mg     1.9     10-25          MICROBIOLOGY:  HIV neg  LYME IgG/IgM Antibodies Result: 0.28 Index (10.24.17 @ 06:10)  negative          RADIOLOGY:  < from: CT Head No Cont (10.23.17 @ 15:27) >  FINDINGS:  Ventricles and sulci are within normal limits. There are no areas of   attenuation abnormality within the brain parenchyma. There is no   intraparenchymal hematoma, mass effect or midline shift. No abnormal   extra-axial fluid collections are present. There is no evidence of acute   transcortical territorial infarction.    The calvarium is intact. The visualized intraorbital compartments,   paranasal sinuses and tympanomastoid cavities appear free of acute   disease.      IMPRESSION:  No acute intracranial hemorrhage.  Noncontrast CT head imaging is within normal limits.    < end of copied text >

## 2017-10-25 NOTE — PROGRESS NOTE ADULT - SUBJECTIVE AND OBJECTIVE BOX
Patient is a 27y old  Female who presents with a chief complaint of nausea, vomiting, cp (23 Oct 2017 03:07)      SUBJECTIVE / OVERNIGHT EVENTS: Improved lethargy. Pt endorses occasional CP    MEDICATIONS  (STANDING):  valACYclovir 500 milliGRAM(s) Oral two times a day    MEDICATIONS  (PRN):  ibuprofen  Tablet 400 milliGRAM(s) Oral every 6 hours PRN pain  meclizine 12.5 milliGRAM(s) Oral three times a day PRN Dizziness  ondansetron Injectable 6 milliGRAM(s) IV Push every 8 hours PRN Nausea and/or Vomiting      T(C): 36.6 (10-25-17 @ 06:16), Max: 36.7 (10-24-17 @ 21:24)  HR: 56 (10-25-17 @ 06:16) (56 - 64)  BP: 105/63 (10-25-17 @ 06:16) (105/63 - 119/67)  RR: 18 (10-25-17 @ 06:16) (18 - 18)  SpO2: 100% (10-25-17 @ 06:16) (98% - 100%)  CAPILLARY BLOOD GLUCOSE        I&O's Summary      PHYSICAL EXAM:  GENERAL: NAD,   HEAD:  Normocephalic  EYES: EOMI,  conjunctiva and sclera clear  NECK: Supple,   CHEST/LUNG: Clear to auscultation bilaterally; No wheeze  HEART:  s1 s2 + Regular rate and rhythm;   ABDOMEN: Soft, Nontender, Nondistended; Bowel sounds present  EXTREMITIES:   No clubbing, cyanosis  NEURO: AAOx3      LABS:                        11.5   6.61  )-----------( 210      ( 25 Oct 2017 06:00 )             35.7     10-25    137  |  102  |  10  ----------------------------<  79  4.4   |  24  |  0.87    Ca    8.8      25 Oct 2017 06:00  Mg     1.9     10-25        CARDIAC MARKERS ( 24 Oct 2017 19:02 )  x     / < 0.06 ng/mL / 58 u/L / x     / x              Care Discussed with Consultants/Other Providers: Dr. Leigh

## 2017-10-26 VITALS
DIASTOLIC BLOOD PRESSURE: 60 MMHG | OXYGEN SATURATION: 100 % | HEART RATE: 69 BPM | SYSTOLIC BLOOD PRESSURE: 117 MMHG | RESPIRATION RATE: 18 BRPM | TEMPERATURE: 98 F

## 2017-10-26 PROCEDURE — 99239 HOSP IP/OBS DSCHRG MGMT >30: CPT

## 2017-10-26 RX ORDER — MECLIZINE HCL 12.5 MG
1 TABLET ORAL
Qty: 30 | Refills: 0 | OUTPATIENT
Start: 2017-10-26 | End: 2017-11-05

## 2017-10-26 RX ORDER — IBUPROFEN 200 MG
1 TABLET ORAL
Qty: 0 | Refills: 0 | DISCHARGE
Start: 2017-10-26

## 2017-10-26 RX ORDER — ACYCLOVIR SODIUM 500 MG
1 VIAL (EA) INTRAVENOUS
Qty: 0 | Refills: 0 | COMMUNITY

## 2017-10-26 RX ADMIN — ONDANSETRON 6 MILLIGRAM(S): 8 TABLET, FILM COATED ORAL at 04:17

## 2017-10-26 RX ADMIN — VALACYCLOVIR 500 MILLIGRAM(S): 500 TABLET, FILM COATED ORAL at 05:43

## 2017-10-26 RX ADMIN — VALACYCLOVIR 500 MILLIGRAM(S): 500 TABLET, FILM COATED ORAL at 16:04

## 2017-10-26 NOTE — DISCHARGE NOTE ADULT - MEDICATION SUMMARY - MEDICATIONS TO TAKE
I will START or STAY ON the medications listed below when I get home from the hospital:    ibuprofen 400 mg oral tablet  -- 1 tab(s) by mouth every 6 hours, As needed, pain  -- Indication: For Pain reliever    meclizine 12.5 mg oral tablet  -- 1 tab(s) by mouth 3 times a day, As needed, Dizziness  -- Indication: For Dizziness

## 2017-10-26 NOTE — PROGRESS NOTE ADULT - ATTENDING COMMENTS
Agree with findings and plans above.  Doubt pericarditis. Vagally mediated bradycardia. No need for further monitoring.
Plan for d/c today. Pt endorsed having social stressors. She refuse SW eval for now. Plan for d/c after last dose of Valtrex. Spent 40 min on d/c

## 2017-10-26 NOTE — DISCHARGE NOTE ADULT - PATIENT PORTAL LINK FT
“You can access the [facility] Patient Portal, offered by [facility], by registering with the following website: [email or web address]”

## 2017-10-26 NOTE — DISCHARGE NOTE ADULT - ADDITIONAL INSTRUCTIONS
Follow up with  PMD within one week of discharge. Call for appointment. Return to ED for any concerning symptoms. Continue medications as prescribed. Low salt, low fat, low cholesterol diet.

## 2017-10-26 NOTE — PROGRESS NOTE ADULT - PROBLEM SELECTOR PLAN 2
-unclear etiology, Mobitz Type 1  - Lyme neg, less likely sarcoid given normal CT chest, normal ESR and CRP  -continue TELE monitoring,   - No plan per EP for inpt workup
-unclear etiology, Mobitz Type 1  - f/u infectious workup, less likely sarcoid given normal CT chest, normal ESR and CRP  -continue TELE monitoring, will f/u with Ep when to d/c tele
-The NSAIDs may plan a role in decreasing inflammation (labyrinthitis, [place]) however I have a low clinical suspicion for pericarditis, could consider D/Cing if indication is solely for suspected pericarditis  -If plan is to continue NSAIDs consider PPI
-unclear   - f/u infectious workup, less likely sarcoid given normal CT chest, normal ESR and CRP  -continue TELE monitoring

## 2017-10-26 NOTE — PROGRESS NOTE ADULT - PROBLEM SELECTOR PLAN 1
-improved  -patient is current CP free and CTA neg for PE  -TELE monitor  -CE neg x2, trend CE PRN chest pain  -f/u cardiology  -TTE shows no acute findings
-improved, c/w prn motrin  -patient is current CP free and CTA neg for PE  -TELE monitor  -CE neg x2, trend CE PRN chest pain  -f/u cardiology  -TTE shows no acute findings
-improved  -patient is current CP free and CTA neg for PE  -TELE monitor  -CE neg x2, trend CE PRN chest pain  -f/u cardiology  -TTE shows no acute findings
2:1 at highest w/ narrow escape, suspect supranodal, no pacing indication, suspect 2/2 high vagal tone, TSH wnl as are electrolytes  -Monitor on Tele  -Avoid AVN blocking agents

## 2017-10-26 NOTE — DISCHARGE NOTE ADULT - CARE PLAN
Principal Discharge DX:	Second degree heart block  Goal:	Followup with PMD and take all medications prescribed.  Instructions for follow-up, activity and diet:	Followup with PMD and take all medications prescribed. Low salt, low fat, low cholesterol diet  Secondary Diagnosis:	Pleuritic chest pain  Goal:	Followup with PMD and take all medications prescribed.  Instructions for follow-up, activity and diet:	Followup with PMD and take all medications prescribed. Low salt, low fat, low cholesterol diet

## 2017-10-26 NOTE — PROGRESS NOTE ADULT - ASSESSMENT
28 y/o F w/ PMHx of Pericarditis dx 2016, Mobitz type I AV block, prior negative workup for Lyme dz, recently dx w/ Bell's Palsy 1 wk ago on prednisone and acyclovir presents a/w pleuritic chest pain and 2nd degree AV block.
28 y/o F w/ PMHx of Pericarditis dx 2016, Mobitz type I AV block, prior negative workup for Lyme dz, recently dx w/ Bell's Palsy 1 wk ago on prednisone and acyclovir presents a/w pleuritic chest pain and 2nd degree AV block.
Bell's Palsy  Doubt related to lyme.  Lyme titer negative.  Would change acyclovir to valtrex 500 mg po BID x 1 more day for Bell's palsy tx.
Pitts's Palsy  Low suspicion for Lyme  Await Lyme ALLIE  Would change acyclovir to valtrex 500 mg po BID x 2 days.
26 y/o F w/ PMHx of Pericarditis dx 2016, Mobitz type I AV block, prior negative workup for Lyme dz, recently dx w/ Bell's Palsy 1 wk ago on prednisone and acyclovir presents a/w pleuritic chest pain and 2nd degree AV block.
27F w/ PMHx of Pericarditis (12/2016), recently Dx w/ Pitts's (was on Prednisone, now on Acyclovir) p/w pleuritic CP. FOund to have HB on Tele (highest level is 2:1 w/ narrow escape). I have a low clinical suspicion for recurrent pericarditis. The HB is likely vagally mediated. The dizziness may be a labyrinthitis. I do not suspect the HB is causing the dizziness because the symptom is present when the patient is in NSR.

## 2017-10-26 NOTE — PROGRESS NOTE ADULT - SUBJECTIVE AND OBJECTIVE BOX
Patient is a 27y old  Female who presents with a chief complaint of nausea, vomiting, chest pain (26 Oct 2017 11:42)      SUBJECTIVE / OVERNIGHT EVENTS: Improved. CP intermittent    MEDICATIONS  (STANDING):  valACYclovir 500 milliGRAM(s) Oral two times a day    MEDICATIONS  (PRN):  ibuprofen  Tablet 400 milliGRAM(s) Oral every 6 hours PRN pain  meclizine 12.5 milliGRAM(s) Oral three times a day PRN Dizziness  ondansetron Injectable 6 milliGRAM(s) IV Push every 8 hours PRN Nausea and/or Vomiting      T(C): 36.5 (10-26-17 @ 10:48), Max: 36.9 (10-25-17 @ 20:15)  HR: 69 (10-26-17 @ 10:48) (58 - 78)  BP: 117/60 (10-26-17 @ 10:48) (104/67 - 121/67)  RR: 18 (10-26-17 @ 10:48) (18 - 18)  SpO2: 100% (10-26-17 @ 10:48) (96% - 100%)  CAPILLARY BLOOD GLUCOSE        I&O's Summary      PHYSICAL EXAM:  GENERAL: NAD,   HEAD:  Normocephalic  EYES: EOMI,  conjunctiva and sclera clear  NECK: Supple,   CHEST/LUNG: Clear to auscultation bilaterally; No wheeze  HEART:  s1 s2 + Regular rate and rhythm;   ABDOMEN: Soft, Nontender, Nondistended; Bowel sounds present  EXTREMITIES:   No clubbing, cyanosis  NEURO: AAOx3      LABS:                        11.5   6.61  )-----------( 210      ( 25 Oct 2017 06:00 )             35.7     10-25    137  |  102  |  10  ----------------------------<  79  4.4   |  24  |  0.87    Ca    8.8      25 Oct 2017 06:00  Mg     1.9     10-25        CARDIAC MARKERS ( 24 Oct 2017 19:02 )  x     / < 0.06 ng/mL / 58 u/L / x     / x                  Consultant(s) Notes Reviewed:  EP    Care Discussed with Consultants/Other Providers: Dr. Floyd ( ID)

## 2017-10-26 NOTE — DISCHARGE NOTE ADULT - HOSPITAL COURSE
26 y/o F w/ PMHx of Pericarditis dx 2016, Mobitz type I AV block, prior negative workup for Lyme dz, recently dx w/ Bell's Palsy 1 wk ago on prednisone and acyclovir presents a/w pleuritic chest pain and 2nd degree AV block. (transfer from ), now in Mobitz I.   EKG: first EKG (10/22) with first degree AV block. 2nd EKG (10/22) with mobitz type I AV block  CT Chest w/con: No pulmonary embolism identified. Enlarged azygos and hemiazygos veins, which may be secondary to a  vascular anomaly in the abdomen. Dedicated abdominal imaging may be obtained for further evaluation as indicated.  Samson x2: neg  10/23: House Cards consulted: Possibly recurring pericarditis etiology unknown. Patient  Chest pain free at present.  Hold Colchicine for now, tele, Serial EKG, PRN chest pain  labs: continue to trend cardiac enzymes, risk factor profile to include TSH, HGBA1c, Lipid profile, CMP, Mag, Phos, CBC, pBNP, toxicology screen, HIV, ESR, CRP, MONIE, RVP  Echo to evaluate LV function and wall motion abnormality    10/23: TTE - EF- 63%,  1. Normal mitral valve. Mild mitral regurgitation. 2. Normal left ventricular internal dimensions and wall thicknesses. 3. Normal left ventricular systolic function. No segmental wall motion abnormalities.  Endocardial visualization enhanced with intravenous injection of echo contrast (Definity). 4. Normal right ventricular size and function.  10/23 ID:  Bell's palsy in 28 yo woman with h/o pericarditis and heart block 2016.  Doubt related to Lyme; does not have epidemiologic risk. Would check Lyme ALLIE (testing) with confirmatory Western blot if positive or equivocal. Check HIV, QuatiferonTB gold, syphilis serology. Would look for other etiology for heart block and cranial nerve palsy including non infectious causes such as sarcoid.      10/24 CE - neg, EKG - OK,   10/24 ID: Bell's Palsy Low suspicion for Lyme Await Lyme ALLIE Would change acyclovir to valtrex 500 mg po BID x 2 days.  10/24 EP: The AV block is likely vagally mediated. The dizziness unrelated to AV block as she has c/o dizziness when she is in sinus rhythm.  - No EP interventions at this time. Monitor and maintain electrolytes - Monior on tele. Currently having AV maynor Wenckebach.  10/24 Cardio; Heart block.  2:1 at highest w/ narrow escape, suspect supranodal, no pacing indication, suspect 2/2 high vagal tone,  -Avoid AVN blocking agents.  Pericarditis : -The NSAIDs may plan a role in decreasing inflammation (labyrinthitis, [place]) however I have a low clinical suspicion for pericarditis, could consider D/Cing if indication is solely for suspected pericarditis -If plan is to continue NSAIDs consider PPI.  Doubt pericarditis. Vagally mediated bradycardia. No need for further monitoring.    10/24 Med; Pleuritic CP  -improved -patient is current CP free and CTA neg for PE  -TELE monitor -CE neg x2, trend CE PRN chest pain -f/u cardiology -TTE shows no acute findings. 2nd degree heart block - f/u infectious workup, less likely sarcoid given normal CT chest, normal ESR and CRP -continue TELE monitoring.  Bell's palsy -c/w home acyclovir  -antiemetic PRN -ibuprofen PRN for pain.    10/25- Echo EF 63%, Normal mitral valve. Mild mitral regurgitation. Normal left ventricular internal dimensions and wall thicknesses. Normal left ventricular systolic function. No segmental wall motion abnormalities.  Endocardial visualization enhanced with intravenous injection of echo contrast (Definity). Normal right ventricular size and function.  *** Compared with echocardiogram of 12/12/2016, no significant changes noted.  10/25 ID: Bell's Palsy, Doubt related to lyme.  Lyme titer negative. Would change acyclovir to valtrex 500 mg po BID x 1 more day for Bell's palsy tx.  10/25- EP Note- There are no plans for device on this admission as the patient. If patient experiences symptoms of palps, dizziness, lightheadedness, or syncope, will have pt follow up with EP as outpatient. At this time no further EP intervention planned on this admission.   10/26- As per attending, patient stable for discharge. 28 y/o F w/ PMHx of Pericarditis dx 2016, Mobitz type I AV block, prior negative workup for Lyme dz, recently dx w/ Bell's Palsy 1 wk ago on prednisone and acyclovir presents a/w pleuritic chest pain and 2nd degree AV block. (transfer from ), now in Mobitz I. EP and cardiology was consulted, no acute intervention. ID consulted for bells palsy, Lyme serology neg. Pt completed course with valtrex.    patient stable for discharge.

## 2017-10-26 NOTE — PROGRESS NOTE ADULT - PROBLEM SELECTOR PLAN 3
-c/w valtrex  -antiemetic PRN  -ibuprofen PRN for pain
-c/w valtrex  -antiemetic PRN  -ibuprofen PRN for pain
-c/w home acyclovir  -antiemetic PRN  -ibuprofen PRN for pain

## 2017-10-26 NOTE — PROGRESS NOTE ADULT - PROBLEM SELECTOR PLAN 4
-low risk for VTE  -SCD for DVT ppx

## 2017-10-26 NOTE — DISCHARGE NOTE ADULT - MEDICATION SUMMARY - MEDICATIONS TO STOP TAKING
I will STOP taking the medications listed below when I get home from the hospital:    predniSONE 50 mg oral tablet  -- 60 tab(s) by mouth once a day    acyclovir 800 mg oral tablet  -- 1 tab(s) by mouth 3 times a day

## 2017-10-26 NOTE — PROGRESS NOTE ADULT - PROVIDER SPECIALTY LIST ADULT
Cardiology
Electrophysiology
Hospitalist
Hospitalist
Infectious Disease
Infectious Disease
Hospitalist

## 2019-06-20 NOTE — H&P ADULT. - EKG AND INTERPRETATION
knee pain/injury 12/30/16 03:02 71bpm NSR 1st degree AVB (CA 274ms)  12/30/16 06:06 47bpm sinus rhythm with Mobitz I

## 2020-05-26 NOTE — ED ADULT NURSE NOTE - DOES PATIENT HAVE ADVANCE DIRECTIVE
Patient's son, Daniel calling to speak with Dr. Vaz.  He would like a return call to discuss patient's condition.  Daniel can be reached today at [phone].   No

## 2020-09-28 NOTE — CHART NOTE - NSCHARTNOTEFT_GEN_A_CORE
Patient seen and examined this AM. Currently, complaining of mild intermittent chest pain, nausea, and vertigo. Exam is unremarkable at this time. Labs are only remarkable for a mild normocytic anemia. EKG, personally reviewed, shows 2nd degree AV block, Morbitz type 1 but on telemetry, patient appears to be in 1st degree AV block. TTE, results personally reviewed, reveals no pericarditis and is otherwise normal. Etiology of chest pain is unclear at this time. As per cardiology, will monitor on telemetry for another 24 hours (low suspicion for pericarditis), consult ID for rule out Lyme disease, and start Meclizine PRN for vertigo symptoms. Advance diet as tolerated. Spoke with patient, notified him of biopsy results and that Dr. Rashid's staff will be getting in contact with him to schedule Mohs.  Also notified him that Dr. Williamson sent in Doxycycline for him to take for his foot.  He is to call us if his foot gets worse or does not improve by the end of the week.  Patient verbalized understanding.

## 2021-04-14 ENCOUNTER — INPATIENT (INPATIENT)
Facility: HOSPITAL | Age: 31
LOS: 1 days | Discharge: HOME | DRG: 392 | End: 2021-04-16
Attending: INTERNAL MEDICINE | Admitting: INTERNAL MEDICINE
Payer: MEDICAID

## 2021-04-14 VITALS
OXYGEN SATURATION: 100 % | SYSTOLIC BLOOD PRESSURE: 130 MMHG | DIASTOLIC BLOOD PRESSURE: 86 MMHG | RESPIRATION RATE: 20 BRPM | TEMPERATURE: 99 F | WEIGHT: 251.33 LBS | HEIGHT: 68 IN | HEART RATE: 76 BPM

## 2021-04-14 DIAGNOSIS — I44.1 ATRIOVENTRICULAR BLOCK, SECOND DEGREE: ICD-10-CM

## 2021-04-14 DIAGNOSIS — Z98.891 HISTORY OF UTERINE SCAR FROM PREVIOUS SURGERY: Chronic | ICD-10-CM

## 2021-04-14 DIAGNOSIS — R07.9 CHEST PAIN, UNSPECIFIED: ICD-10-CM

## 2021-04-14 DIAGNOSIS — D64.9 ANEMIA, UNSPECIFIED: ICD-10-CM

## 2021-04-14 DIAGNOSIS — Z29.9 ENCOUNTER FOR PROPHYLACTIC MEASURES, UNSPECIFIED: ICD-10-CM

## 2021-04-14 LAB
ALBUMIN SERPL ELPH-MCNC: 3.3 G/DL — LOW (ref 3.5–5)
ALP SERPL-CCNC: 76 U/L — SIGNIFICANT CHANGE UP (ref 40–120)
ALT FLD-CCNC: 24 U/L DA — SIGNIFICANT CHANGE UP (ref 10–60)
ANION GAP SERPL CALC-SCNC: 4 MMOL/L — LOW (ref 5–17)
AST SERPL-CCNC: 13 U/L — SIGNIFICANT CHANGE UP (ref 10–40)
BASOPHILS # BLD AUTO: 0.03 K/UL — SIGNIFICANT CHANGE UP (ref 0–0.2)
BASOPHILS NFR BLD AUTO: 0.6 % — SIGNIFICANT CHANGE UP (ref 0–2)
BILIRUB SERPL-MCNC: 0.3 MG/DL — SIGNIFICANT CHANGE UP (ref 0.2–1.2)
BUN SERPL-MCNC: 16 MG/DL — SIGNIFICANT CHANGE UP (ref 7–18)
CALCIUM SERPL-MCNC: 9 MG/DL — SIGNIFICANT CHANGE UP (ref 8.4–10.5)
CHLORIDE SERPL-SCNC: 107 MMOL/L — SIGNIFICANT CHANGE UP (ref 96–108)
CO2 SERPL-SCNC: 26 MMOL/L — SIGNIFICANT CHANGE UP (ref 22–31)
CREAT SERPL-MCNC: 0.99 MG/DL — SIGNIFICANT CHANGE UP (ref 0.5–1.3)
D DIMER BLD IA.RAPID-MCNC: 225 NG/ML DDU — SIGNIFICANT CHANGE UP
EOSINOPHIL # BLD AUTO: 0.07 K/UL — SIGNIFICANT CHANGE UP (ref 0–0.5)
EOSINOPHIL NFR BLD AUTO: 1.3 % — SIGNIFICANT CHANGE UP (ref 0–6)
GLUCOSE SERPL-MCNC: 76 MG/DL — SIGNIFICANT CHANGE UP (ref 70–99)
HCG SERPL-ACNC: <1 MIU/ML — SIGNIFICANT CHANGE UP
HCT VFR BLD CALC: 34.5 % — SIGNIFICANT CHANGE UP (ref 34.5–45)
HGB BLD-MCNC: 10.9 G/DL — LOW (ref 11.5–15.5)
IMM GRANULOCYTES NFR BLD AUTO: 0 % — SIGNIFICANT CHANGE UP (ref 0–1.5)
LYMPHOCYTES # BLD AUTO: 2.18 K/UL — SIGNIFICANT CHANGE UP (ref 1–3.3)
LYMPHOCYTES # BLD AUTO: 41.1 % — SIGNIFICANT CHANGE UP (ref 13–44)
MCHC RBC-ENTMCNC: 27.7 PG — SIGNIFICANT CHANGE UP (ref 27–34)
MCHC RBC-ENTMCNC: 31.6 GM/DL — LOW (ref 32–36)
MCV RBC AUTO: 87.8 FL — SIGNIFICANT CHANGE UP (ref 80–100)
MONOCYTES # BLD AUTO: 0.49 K/UL — SIGNIFICANT CHANGE UP (ref 0–0.9)
MONOCYTES NFR BLD AUTO: 9.2 % — SIGNIFICANT CHANGE UP (ref 2–14)
NEUTROPHILS # BLD AUTO: 2.54 K/UL — SIGNIFICANT CHANGE UP (ref 1.8–7.4)
NEUTROPHILS NFR BLD AUTO: 47.8 % — SIGNIFICANT CHANGE UP (ref 43–77)
NRBC # BLD: 0 /100 WBCS — SIGNIFICANT CHANGE UP (ref 0–0)
PLATELET # BLD AUTO: 240 K/UL — SIGNIFICANT CHANGE UP (ref 150–400)
POTASSIUM SERPL-MCNC: 4.4 MMOL/L — SIGNIFICANT CHANGE UP (ref 3.5–5.3)
POTASSIUM SERPL-SCNC: 4.4 MMOL/L — SIGNIFICANT CHANGE UP (ref 3.5–5.3)
PROT SERPL-MCNC: 7.6 G/DL — SIGNIFICANT CHANGE UP (ref 6–8.3)
RBC # BLD: 3.93 M/UL — SIGNIFICANT CHANGE UP (ref 3.8–5.2)
RBC # FLD: 13.9 % — SIGNIFICANT CHANGE UP (ref 10.3–14.5)
SARS-COV-2 RNA SPEC QL NAA+PROBE: SIGNIFICANT CHANGE UP
SODIUM SERPL-SCNC: 137 MMOL/L — SIGNIFICANT CHANGE UP (ref 135–145)
TROPONIN I SERPL-MCNC: <0.015 NG/ML — SIGNIFICANT CHANGE UP (ref 0–0.04)
TROPONIN I SERPL-MCNC: <0.015 NG/ML — SIGNIFICANT CHANGE UP (ref 0–0.04)
WBC # BLD: 5.31 K/UL — SIGNIFICANT CHANGE UP (ref 3.8–10.5)
WBC # FLD AUTO: 5.31 K/UL — SIGNIFICANT CHANGE UP (ref 3.8–10.5)

## 2021-04-14 PROCEDURE — 71045 X-RAY EXAM CHEST 1 VIEW: CPT | Mod: 26

## 2021-04-14 PROCEDURE — 99223 1ST HOSP IP/OBS HIGH 75: CPT | Mod: GC

## 2021-04-14 PROCEDURE — 74174 CTA ABD&PLVS W/CONTRAST: CPT | Mod: 26,MG

## 2021-04-14 PROCEDURE — G1004: CPT

## 2021-04-14 PROCEDURE — 71275 CT ANGIOGRAPHY CHEST: CPT | Mod: 26,MG

## 2021-04-14 PROCEDURE — 99285 EMERGENCY DEPT VISIT HI MDM: CPT

## 2021-04-14 RX ORDER — KETOROLAC TROMETHAMINE 30 MG/ML
30 SYRINGE (ML) INJECTION ONCE
Refills: 0 | Status: DISCONTINUED | OUTPATIENT
Start: 2021-04-14 | End: 2021-04-14

## 2021-04-14 RX ORDER — ASPIRIN/CALCIUM CARB/MAGNESIUM 324 MG
81 TABLET ORAL ONCE
Refills: 0 | Status: COMPLETED | OUTPATIENT
Start: 2021-04-14 | End: 2021-04-14

## 2021-04-14 RX ORDER — IBUPROFEN 200 MG
600 TABLET ORAL EVERY 8 HOURS
Refills: 0 | Status: DISCONTINUED | OUTPATIENT
Start: 2021-04-14 | End: 2021-04-15

## 2021-04-14 RX ORDER — ONDANSETRON 8 MG/1
4 TABLET, FILM COATED ORAL ONCE
Refills: 0 | Status: COMPLETED | OUTPATIENT
Start: 2021-04-14 | End: 2021-04-14

## 2021-04-14 RX ADMIN — Medication 81 MILLIGRAM(S): at 15:30

## 2021-04-14 RX ADMIN — ONDANSETRON 4 MILLIGRAM(S): 8 TABLET, FILM COATED ORAL at 14:26

## 2021-04-14 RX ADMIN — Medication 30 MILLIGRAM(S): at 11:51

## 2021-04-14 RX ADMIN — Medication 600 MILLIGRAM(S): at 21:50

## 2021-04-14 RX ADMIN — Medication 600 MILLIGRAM(S): at 22:05

## 2021-04-14 RX ADMIN — Medication 30 MILLIGRAM(S): at 14:00

## 2021-04-14 NOTE — H&P ADULT - ASSESSMENT
30 yr F with PMH of Pericarditis (diagnosed in 2016), Mobitz type I second degree AV block, has loop recorder presents to ED with chest pain since morning.      In ED, /86, HR 76       Pt will be admitted for chest pain and near syncope

## 2021-04-14 NOTE — ED PROVIDER NOTE - OBJECTIVE STATEMENT
Patient reports she developed sudden, stabbing chest pain around 9am today. felt short of breath. Worse with deep breath. Took a shower, nearly passed out while getting out of the shower. Sat down and felt better. pain is constant at a low level with intermittent, severe stabbing. No ha, fever, cough, ap, n/v/d, diaphoresis. Patient has h/o AV block and has a loop recorder.

## 2021-04-14 NOTE — H&P ADULT - PROBLEM SELECTOR PLAN 2
H/O Mobitz Type 1 heart block unclear etiology, possibly related to hx of pericarditis  Lyme workup in 12/2016 was negative  continue TELE monitoring H/O Mobitz Type 1 heart block unclear etiology, possibly related to hx of pericarditis  Lyme workup in 12/2016 was negative  Pt has episode of near syncope today  continue TELE monitoring

## 2021-04-14 NOTE — H&P ADULT - PROBLEM SELECTOR PLAN 4
RISK                                                          Points  [  ] Previous VTE                                                3  [  ] Thrombophilia                                             2  [  ] Lower limb paralysis                                   2        (unable to hold up >15 seconds)    [  ] Current Cancer                                             2         (within 6 months)  [ x ] Immobilization > 24 hrs                              1  [  ] ICU/CCU stay > 24 hours                             1  [   ] Age > 60                                                         1    IMPROVE VTE Score: 1  No indication for VTE prophylaxis.

## 2021-04-14 NOTE — H&P ADULT - PROBLEM SELECTOR PLAN 3
RISK                                                          Points  [  ] Previous VTE                                                3  [  ] Thrombophilia                                             2  [  ] Lower limb paralysis                                   2        (unable to hold up >15 seconds)    [  ] Current Cancer                                             2         (within 6 months)  [ x ] Immobilization > 24 hrs                              1  [  ] ICU/CCU stay > 24 hours                             1  [   ] Age > 60                                                         1    IMPROVE VTE Score: 1  No indication for VTE prophylaxis. Hb 10.9 on presentation  f/u Anemia panel  Monitor CBC

## 2021-04-14 NOTE — H&P ADULT - HISTORY OF PRESENT ILLNESS
30 yr F with PMH of Pericarditis (diagnosed in 2016), Mobitz type I second degree AV block, has loop recorder presents to ED with chest pain since morning. Pt reports sudden onset, 5/10 in intensity, stabbing pain around 9 AM today, associated with shortness of breath and it gets worse with deep breath. Pt reports she nearly passed out today while getting out of the shower and she felt better after she sat down. Pt denies any cough, fever, palpitations, N/V/D, abdominal pain, urinary symptoms or any other acute complaints. Off note, pt had negative workup for lyme disease in 2016 when she was diagnosed with Mobitz type I AV block.    In ED, /86, HR 76  30 yr F with PMH of Pericarditis (diagnosed in 2016), Mobitz type I second degree AV block, has loop recorder (placed 2 yrs ago) presents to ED with chest pain since morning. Pt reports sudden onset, 5/10 in intensity, stabbing pain around 9 AM today, associated with shortness of breath and it gets worse with deep breath. Pt reports she nearly passed out today while getting out of the shower and she felt better after she sat down. Pt sees Dr. Enrrique Alonzo, Electrophysiologist who recommended pt might need pacemaker if pt has recurrent episodes of near syncope. Pt reports her HR going down to 30s occasionally. Pt reports ankle swelling and intermittent ankle pain. Pt denies any cough, fever, palpitations, N/V/D, abdominal pain, urinary symptoms or any other acute complaints. Off note, pt had negative workup for lyme disease in 2016 when she was diagnosed with Mobitz type I AV block.    In ED, /86, HR 76

## 2021-04-14 NOTE — ED PROVIDER NOTE - PROGRESS NOTE DETAILS
Patient reports pain medicine didn't help but declined further pain medicine. Now nauseous. will get CTA chest and give zofran. Patient reports pain is unchanged. Declined pain medicine. Will admit for further cardiac w/u. Endorsed to Dr. Spencer.

## 2021-04-14 NOTE — H&P ADULT - ATTENDING COMMENTS
Patient is a 30y old  Female who presents with a chief complaint of sharp pleuritic chest pain. No aggravating or alleviating factor. Chest pain persists even after Ketorolac in ER. She       INTERVAL HPI/OVERNIGHT EVENTS:  T(C): 36.7 (04-14-21 @ 15:36), Max: 37 (04-14-21 @ 10:24)  HR: 60 (04-14-21 @ 15:36) (60 - 76)  BP: 125/83 (04-14-21 @ 15:36) (125/83 - 138/92)  RR: 18 (04-14-21 @ 15:36) (18 - 20)  SpO2: 100% (04-14-21 @ 15:36) (100% - 100%)  Wt(kg): --  I&O's Summary      REVIEW OF SYSTEMS: denies fever, chills, SOB, palpitations, chest pain, abdominal pain, nausea, vomitting, diarrhea, constipation, dizziness    MEDICATIONS  (STANDING):    MEDICATIONS  (PRN):      PHYSICAL EXAM:  GENERAL: NAD, well-groomed, well-developed  HEAD:  Atraumatic, Normocephalic  EYES: EOMI, PERRLA, conjunctiva and sclera clear  ENMT: No tonsillar erythema, exudates, or enlargement; Moist mucous membranes, Good dentition, No lesions  NECK: Supple, No JVD, Normal thyroid  NERVOUS SYSTEM:  Alert & Oriented X3, Good concentration; Motor Strength 5/5 B/L upper and lower extremities; DTRs 2+ intact and symmetric  CHEST/LUNG: Clear to percussion bilaterally; No rales, rhonchi, wheezing, or rubs  HEART: Regular rate and rhythm; No murmurs, rubs, or gallops  ABDOMEN: Soft, Nontender, Nondistended; Bowel sounds present  EXTREMITIES:  2+ Peripheral Pulses, No clubbing, cyanosis, or edema  LYMPH: No lymphadenopathy noted  SKIN: No rashes or lesions  LABS:                        10.9   5.31  )-----------( 240      ( 14 Apr 2021 11:17 )             34.5     137  |  107  |  16  ----------------------------<  76  4.4   |  26  |  0.99    Ca    9.0      14 Apr 2021 11:17    TPro  7.6  /  Alb  3.3<L>  /  TBili  0.3  /  DBili  x   /  AST  13  /  ALT  24  /  AlkPhos  76  04-14 Patient is a 30y old  Female who presents with a chief complaint of sharp pleuritic chest pain. No aggravating or alleviating factor. Chest pain persists even after Ketorolac in ER. She denies any cough, SOB but reports intermittent palpitations. Also reported left ankle pain and swelling which has resolved now.     INTERVAL HPI/OVERNIGHT EVENTS:  T(C): 36.7 (04-14-21 @ 15:36), Max: 37 (04-14-21 @ 10:24)  HR: 60 (04-14-21 @ 15:36) (60 - 76)  BP: 125/83 (04-14-21 @ 15:36) (125/83 - 138/92)  RR: 18 (04-14-21 @ 15:36) (18 - 20)  SpO2: 100% (04-14-21 @ 15:36) (100% - 100%)  Wt(kg): --  I&O's Summary      REVIEW OF SYSTEMS: denies fever, chills, SOB, abdominal pain, nausea, vomiting, diarrhea, constipation, dizziness    PHYSICAL EXAM:  GENERAL: NAD, well-groomed, well-developed  NERVOUS SYSTEM:  Alert & Oriented X3, Good concentration; Motor Strength 5/5 B/L upper and lower extremities  CHEST/LUNG: Clear to auscultation bilaterally; No rales, rhonchi, wheezing, or rubs  HEART: Regular rate and rhythm; No murmurs, rubs, or gallops  ABDOMEN: Soft, Nontender, Nondistended; Bowel sounds present  EXTREMITIES:  2+ Peripheral Pulses, No clubbing, cyanosis, or edema  SKIN: No rashes or lesions    LABS:                        10.9   5.31  )-----------( 240      ( 14 Apr 2021 11:17 )             34.5     137  |  107  |  16  ----------------------------<  76  4.4   |  26  |  0.99    Ca    9.0      14 Apr 2021 11:17    TPro  7.6  /  Alb  3.3<L>  /  TBili  0.3  /  DBili  x   /  AST  13  /  ALT  24  /  AlkPhos  76  04-14    A/P:  Pleuritic chest pain, to rule out pericarditis vs ACS  H/o Mobitz type 1 Second degree heart block, s/p loop recorder  Morbid obesity  H/o pericarditis   Anemia     Plan:  Trop x1 neg, EKG no new changes (as per ER attending)  Trend trop, serial EKG  Tele monitoring for 1day, patient reports bradycardia to 37 before, complains of intermittent palpitations, but chest pain is not related to palpitations  Cardiology consult   TTE  With Anemia and h/o joint pain, will work up for pericarditis. Obtain ESR, CRP, MONIE, RF, DS DNA; No pericardial effusion in CT chest   Send anemia work up  Check thyroid panel   Full code Patient is a 30y old  Female who presents with a chief complaint of sharp pleuritic chest pain. No aggravating or alleviating factor. Chest pain persists even after Ketorolac in ER. She denies any cough, SOB but reports intermittent palpitations. Also reported left ankle pain and swelling which has resolved now.     INTERVAL HPI/OVERNIGHT EVENTS:  T(C): 36.7 (04-14-21 @ 15:36), Max: 37 (04-14-21 @ 10:24)  HR: 60 (04-14-21 @ 15:36) (60 - 76)  BP: 125/83 (04-14-21 @ 15:36) (125/83 - 138/92)  RR: 18 (04-14-21 @ 15:36) (18 - 20)  SpO2: 100% (04-14-21 @ 15:36) (100% - 100%)  Wt(kg): --  I&O's Summary      REVIEW OF SYSTEMS: denies fever, chills, SOB, abdominal pain, nausea, vomiting, diarrhea, constipation, dizziness    PHYSICAL EXAM:  GENERAL: NAD, well-groomed, well-developed  NERVOUS SYSTEM:  Alert & Oriented X3, Good concentration; Motor Strength 5/5 B/L upper and lower extremities  CHEST/LUNG: Clear to auscultation bilaterally; No rales, rhonchi, wheezing, or rubs  HEART: Regular rate and rhythm; No murmurs, rubs, or gallops  ABDOMEN: Soft, Nontender, Nondistended; Bowel sounds present  EXTREMITIES:  2+ Peripheral Pulses, No clubbing, cyanosis, or edema  SKIN: No rashes or lesions    LABS:                        10.9   5.31  )-----------( 240      ( 14 Apr 2021 11:17 )             34.5     137  |  107  |  16  ----------------------------<  76  4.4   |  26  |  0.99    Ca    9.0      14 Apr 2021 11:17    TPro  7.6  /  Alb  3.3<L>  /  TBili  0.3  /  DBili  x   /  AST  13  /  ALT  24  /  AlkPhos  76  04-14    A/P:  Pleuritic chest pain, to rule out pericarditis vs ACS  Pre-syncopal symptoms  H/o Mobitz type 1 Second degree heart block, s/p loop recorder  Morbid obesity  H/o pericarditis   Anemia     Plan:  Trop x1 neg, EKG 1st Degree AV block  Trend trop, serial EKG  Tele monitoring for 1day, patient reports bradycardia to 37 before, complains of intermittent palpitations, but chest pain is not related to palpitations  Cardiology consult, may benefit from PPM for symptomatic bradycardia  TTE  Start trial of NSAIDs  With Anemia and h/o joint pain, will work up for pericarditis. Obtain ESR, CRP, MNOIE, RF, DS DNA; No pericardial effusion in CT chest   Send anemia work up  Check thyroid panel   Full code

## 2021-04-14 NOTE — ED ADULT TRIAGE NOTE - CHIEF COMPLAINT QUOTE
Stabbing chest pain x 30 mins PTA. Dizziness, and difficulty breathing. Near syncopal episode at home.

## 2021-04-14 NOTE — ED PROVIDER NOTE - CLINICAL SUMMARY MEDICAL DECISION MAKING FREE TEXT BOX
Patient with sudden onset of chest pain. Will get labs, CXR, reassess. DDx: PE, PTX, aortic dissection, ACS, COVID.

## 2021-04-14 NOTE — H&P ADULT - PROBLEM SELECTOR PLAN 1
p/w stabbing chest pain since morning  CT angio chest is negative for PE or aneurysm or dissection  EKG shows Mobitz Type 1 heart block  troponin x1 is negative  Tele monitoring  Trend cardiac enzymes  f/u Echocardiogram p/w stabbing chest pain since morning  CT angio chest is negative for PE or aneurysm or dissection  EKG shows Mobitz Type 1 heart block  troponin x1 is negative  Tele monitoring  Trend cardiac enzymes  f/u Echocardiogram  Cardio  p/w stabbing chest pain since morning  Recurrent pericarditis vs ACS  CT angio chest is negative for PE or aneurysm or dissection  EKG shows SR with 1st degree AV block  troponin x1 is negative  Tele monitoring  Started on ibuprofen for possible pericarditis   Trend cardiac enzymes  f/u Echocardiogram  Cardio Dr. Turk

## 2021-04-15 LAB
A1C WITH ESTIMATED AVERAGE GLUCOSE RESULT: 4.9 % — SIGNIFICANT CHANGE UP (ref 4–5.6)
ALBUMIN SERPL ELPH-MCNC: 3.1 G/DL — LOW (ref 3.5–5)
ALP SERPL-CCNC: 72 U/L — SIGNIFICANT CHANGE UP (ref 40–120)
ALT FLD-CCNC: 21 U/L DA — SIGNIFICANT CHANGE UP (ref 10–60)
ANION GAP SERPL CALC-SCNC: 4 MMOL/L — LOW (ref 5–17)
AST SERPL-CCNC: 10 U/L — SIGNIFICANT CHANGE UP (ref 10–40)
BASOPHILS # BLD AUTO: 0.04 K/UL — SIGNIFICANT CHANGE UP (ref 0–0.2)
BASOPHILS NFR BLD AUTO: 0.7 % — SIGNIFICANT CHANGE UP (ref 0–2)
BILIRUB SERPL-MCNC: 0.5 MG/DL — SIGNIFICANT CHANGE UP (ref 0.2–1.2)
BUN SERPL-MCNC: 18 MG/DL — SIGNIFICANT CHANGE UP (ref 7–18)
CALCIUM SERPL-MCNC: 8.8 MG/DL — SIGNIFICANT CHANGE UP (ref 8.4–10.5)
CHLORIDE SERPL-SCNC: 104 MMOL/L — SIGNIFICANT CHANGE UP (ref 96–108)
CHOLEST SERPL-MCNC: 189 MG/DL — SIGNIFICANT CHANGE UP
CO2 SERPL-SCNC: 27 MMOL/L — SIGNIFICANT CHANGE UP (ref 22–31)
COVID-19 SPIKE DOMAIN AB INTERP: POSITIVE
COVID-19 SPIKE DOMAIN ANTIBODY RESULT: 76.4 U/ML — HIGH
CREAT SERPL-MCNC: 1.06 MG/DL — SIGNIFICANT CHANGE UP (ref 0.5–1.3)
CRP SERPL-MCNC: 9 MG/L — HIGH
EOSINOPHIL # BLD AUTO: 0.08 K/UL — SIGNIFICANT CHANGE UP (ref 0–0.5)
EOSINOPHIL NFR BLD AUTO: 1.4 % — SIGNIFICANT CHANGE UP (ref 0–6)
ERYTHROCYTE [SEDIMENTATION RATE] IN BLOOD: 29 MM/HR — HIGH (ref 0–15)
ESTIMATED AVERAGE GLUCOSE: 94 MG/DL — SIGNIFICANT CHANGE UP (ref 68–114)
FERRITIN SERPL-MCNC: 53 NG/ML — SIGNIFICANT CHANGE UP (ref 15–150)
FOLATE SERPL-MCNC: 6 NG/ML — SIGNIFICANT CHANGE UP
GLUCOSE SERPL-MCNC: 76 MG/DL — SIGNIFICANT CHANGE UP (ref 70–99)
HCT VFR BLD CALC: 34.1 % — LOW (ref 34.5–45)
HDLC SERPL-MCNC: 44 MG/DL — LOW
HGB BLD-MCNC: 10.9 G/DL — LOW (ref 11.5–15.5)
IMM GRANULOCYTES NFR BLD AUTO: 0.2 % — SIGNIFICANT CHANGE UP (ref 0–1.5)
IRON SATN MFR SERPL: 29 % — SIGNIFICANT CHANGE UP (ref 15–50)
IRON SATN MFR SERPL: 72 UG/DL — SIGNIFICANT CHANGE UP (ref 40–150)
LIPID PNL WITH DIRECT LDL SERPL: 125 MG/DL — HIGH
LYMPHOCYTES # BLD AUTO: 2.59 K/UL — SIGNIFICANT CHANGE UP (ref 1–3.3)
LYMPHOCYTES # BLD AUTO: 45.9 % — HIGH (ref 13–44)
MAGNESIUM SERPL-MCNC: 1.8 MG/DL — SIGNIFICANT CHANGE UP (ref 1.6–2.6)
MCHC RBC-ENTMCNC: 28.1 PG — SIGNIFICANT CHANGE UP (ref 27–34)
MCHC RBC-ENTMCNC: 32 GM/DL — SIGNIFICANT CHANGE UP (ref 32–36)
MCV RBC AUTO: 87.9 FL — SIGNIFICANT CHANGE UP (ref 80–100)
MONOCYTES # BLD AUTO: 0.47 K/UL — SIGNIFICANT CHANGE UP (ref 0–0.9)
MONOCYTES NFR BLD AUTO: 8.3 % — SIGNIFICANT CHANGE UP (ref 2–14)
NEUTROPHILS # BLD AUTO: 2.45 K/UL — SIGNIFICANT CHANGE UP (ref 1.8–7.4)
NEUTROPHILS NFR BLD AUTO: 43.5 % — SIGNIFICANT CHANGE UP (ref 43–77)
NON HDL CHOLESTEROL: 145 MG/DL — HIGH
NRBC # BLD: 0 /100 WBCS — SIGNIFICANT CHANGE UP (ref 0–0)
PHOSPHATE SERPL-MCNC: 2.9 MG/DL — SIGNIFICANT CHANGE UP (ref 2.5–4.5)
PLATELET # BLD AUTO: 227 K/UL — SIGNIFICANT CHANGE UP (ref 150–400)
POTASSIUM SERPL-MCNC: 4 MMOL/L — SIGNIFICANT CHANGE UP (ref 3.5–5.3)
POTASSIUM SERPL-SCNC: 4 MMOL/L — SIGNIFICANT CHANGE UP (ref 3.5–5.3)
PROT SERPL-MCNC: 7.1 G/DL — SIGNIFICANT CHANGE UP (ref 6–8.3)
RBC # BLD: 3.88 M/UL — SIGNIFICANT CHANGE UP (ref 3.8–5.2)
RBC # FLD: 14.1 % — SIGNIFICANT CHANGE UP (ref 10.3–14.5)
RHEUMATOID FACT SERPL-ACNC: <10 IU/ML — SIGNIFICANT CHANGE UP (ref 0–13)
SARS-COV-2 IGG+IGM SERPL QL IA: 76.4 U/ML — HIGH
SARS-COV-2 IGG+IGM SERPL QL IA: POSITIVE
SODIUM SERPL-SCNC: 135 MMOL/L — SIGNIFICANT CHANGE UP (ref 135–145)
TIBC SERPL-MCNC: 251 UG/DL — SIGNIFICANT CHANGE UP (ref 250–450)
TRANSFERRIN SERPL-MCNC: 196 MG/DL — LOW (ref 200–360)
TRIGL SERPL-MCNC: 102 MG/DL — SIGNIFICANT CHANGE UP
TSH SERPL-MCNC: 1.3 UU/ML — SIGNIFICANT CHANGE UP (ref 0.34–4.82)
UIBC SERPL-MCNC: 179 UG/DL — SIGNIFICANT CHANGE UP (ref 110–370)
VIT B12 SERPL-MCNC: 461 PG/ML — SIGNIFICANT CHANGE UP (ref 232–1245)
WBC # BLD: 5.64 K/UL — SIGNIFICANT CHANGE UP (ref 3.8–10.5)
WBC # FLD AUTO: 5.64 K/UL — SIGNIFICANT CHANGE UP (ref 3.8–10.5)

## 2021-04-15 PROCEDURE — 93306 TTE W/DOPPLER COMPLETE: CPT | Mod: 26

## 2021-04-15 PROCEDURE — 99232 SBSQ HOSP IP/OBS MODERATE 35: CPT

## 2021-04-15 RX ORDER — ACETAMINOPHEN 500 MG
650 TABLET ORAL EVERY 4 HOURS
Refills: 0 | Status: DISCONTINUED | OUTPATIENT
Start: 2021-04-15 | End: 2021-04-16

## 2021-04-15 RX ORDER — KETOROLAC TROMETHAMINE 30 MG/ML
15 SYRINGE (ML) INJECTION EVERY 6 HOURS
Refills: 0 | Status: DISCONTINUED | OUTPATIENT
Start: 2021-04-15 | End: 2021-04-16

## 2021-04-15 RX ADMIN — Medication 600 MILLIGRAM(S): at 08:34

## 2021-04-15 RX ADMIN — Medication 15 MILLIGRAM(S): at 17:25

## 2021-04-15 RX ADMIN — Medication 15 MILLIGRAM(S): at 12:06

## 2021-04-15 RX ADMIN — Medication 650 MILLIGRAM(S): at 22:17

## 2021-04-15 RX ADMIN — Medication 15 MILLIGRAM(S): at 23:40

## 2021-04-15 RX ADMIN — Medication 650 MILLIGRAM(S): at 23:32

## 2021-04-15 RX ADMIN — Medication 600 MILLIGRAM(S): at 05:23

## 2021-04-15 RX ADMIN — Medication 15 MILLIGRAM(S): at 12:20

## 2021-04-15 RX ADMIN — Medication 15 MILLIGRAM(S): at 17:06

## 2021-04-15 NOTE — PROGRESS NOTE ADULT - PROBLEM SELECTOR PLAN 1
Recurrent pericarditis vs ACS  CT angio chest is negative for PE or aneurysm or dissection  EKG shows SR with 1st degree AV block  troponin negative  Tele monitoring  c/w Toradol  possible pericarditis   f/u Echocardiogram  Cardio Dr. Turk  f/u  ESR, CRP, MONIE, RF, DS DNA

## 2021-04-15 NOTE — CONSULT NOTE ADULT - ASSESSMENT
30 yr F with PMH of Pericarditis (diagnosed in 2016), Mobitz type I second degree AV block, has loop recorder (placed 2 yrs ago) presents to ED with chest pain since morning and near syncope.  1.Tele monitoring.  2.Interrogate ILR.  3.Echocardiogram.  4.Orthostatic bp q shift.  5.GI and DVT prophylaxis.

## 2021-04-15 NOTE — CONSULT NOTE ADULT - SUBJECTIVE AND OBJECTIVE BOX
CHIEF COMPLAINT:Patient is a 30y old  Female who presents with a chief complaint of Chest pain.      HPI:  30 yr F with PMH of Pericarditis (diagnosed in 2016), Mobitz type I second degree AV block, has loop recorder (placed 2 yrs ago) presents to ED with chest pain since morning. Pt reports sudden onset, 5/10 in intensity, stabbing pain around 9 AM today, associated with shortness of breath and it gets worse with deep breath. Pt reports she nearly passed out today while getting out of the shower and she felt better after she sat down. Pt sees Dr. Enrrique Alonzo, Electrophysiologist who recommended pt might need pacemaker if pt has recurrent episodes of near syncope. Pt reports her HR going down to 30s occasionally. Pt reports ankle swelling and intermittent ankle pain. Pt denies any cough, fever, palpitations, N/V/D, abdominal pain, urinary symptoms or any other acute complaints. Off note, pt had negative workup for lyme disease in 2016 when she was diagnosed with Mobitz type I AV block.    In ED, /86, HR 76  (14 Apr 2021 15:35)      PAST MEDICAL & SURGICAL HISTORY:  H/O pericarditis  S/P ILR      MEDICATIONS  (STANDING):  ketorolac   Injectable 15 milliGRAM(s) IV Push every 6 hours    MEDICATIONS  (PRN):      FAMILY HISTORY:No hx of CAD      SOCIAL HISTORY:    [ x] Non-smoker    [ x] Alcohol-denies    Allergies    No Known Allergies    Intolerances    	    REVIEW OF SYSTEMS:  CONSTITUTIONAL: No fever, weight loss, or fatigue  EYES: No eye pain, visual disturbances, or discharge  ENT:  No difficulty hearing, tinnitus, vertigo; No sinus or throat pain  NECK: No pain or stiffness  RESPIRATORY: No cough, wheezing, chills or hemoptysis; No Shortness of Breath  CARDIOVASCULAR: + chest pain, palpitations, + passing out, dizziness, or leg swelling  GASTROINTESTINAL: No abdominal or epigastric pain. No nausea, vomiting, or hematemesis; No diarrhea or constipation. No melena or hematochezia.  GENITOURINARY: No dysuria, frequency, hematuria, or incontinence  NEUROLOGICAL: No headaches, memory loss, loss of strength, numbness, or tremors  SKIN: No itching, burning, rashes, or lesions   LYMPH Nodes: No enlarged glands  ENDOCRINE: No heat or cold intolerance; No hair loss  MUSCULOSKELETAL: No joint pain or swelling; No muscle, back, or extremity pain  PSYCHIATRIC: No depression, anxiety, mood swings, or difficulty sleeping  HEME/LYMPH: No easy bruising, or bleeding gums  ALLERGY AND IMMUNOLOGIC: No hives or eczema	        PHYSICAL EXAM:  T(C): 36.6 (04-15-21 @ 10:49), Max: 36.8 (04-15-21 @ 07:15)  HR: 65 (04-15-21 @ 10:49) (55 - 69)  BP: 121/79 (04-15-21 @ 10:49) (97/63 - 138/90)  RR: 18 (04-15-21 @ 10:49) (18 - 18)  SpO2: 100% (04-15-21 @ 10:49) (97% - 100%)        Appearance: Normal	  HEENT:   Normal oral mucosa, PERRL, EOMI	  Lymphatic: No lymphadenopathy  Cardiovascular: Normal S1 S2, No JVD, No murmurs, No edema  Respiratory: Lungs clear to auscultation	  Psychiatry: A & O x 3, Mood & affect appropriate  Gastrointestinal:  Soft, Non-tender, + BS	  Skin: No rashes, No ecchymoses, No cyanosis	  Neurologic: Non-focal  Extremities: Normal range of motion, No clubbing, cyanosis or edema  Vascular: Peripheral pulses palpable 2+ bilaterally        ECG:  	  Sinus rhythm with 1st degree A-V block    	  LABS:	 	    CARDIAC MARKERS ( 14 Apr 2021 17:29 )  <0.015 ng/mL / x     / x     / x     / x      CARDIAC MARKERS ( 14 Apr 2021 11:17 )  <0.015 ng/mL / x     / x     / x     / x                            10.9   5.64  )-----------( 227      ( 15 Apr 2021 06:07 )             34.1     04-15    135  |  104  |  18  ----------------------------<  76  4.0   |  27  |  1.06    Ca    8.8      15 Apr 2021 06:07  Phos  2.9     04-15  Mg     1.8     04-15    TPro  7.1  /  Alb  3.1<L>  /  TBili  0.5  /  DBili  x   /  AST  10  /  ALT  21  /  AlkPhos  72  04-15      Lipid Profile: Cholesterol 189  LDL --  HDL 44        TSH: Thyroid Stimulating Hormone, Serum: 1.30 uU/mL (04-15 @ 06:07)      EXAM:  CT ANGIO ABD PELV (W)AW IC                          EXAM:  CT ANGIO CHEST (W)AW IC                            PROCEDURE DATE:  04/14/2021          INTERPRETATION:  CLINICAL INFORMATION: Chest pain    COMPARISON: None.    CONTRAST/COMPLICATIONS:  IV Contrast: Omnipaque 350 (accession [id number]), IV contrast documented in associated exam (accession [id number])  90 cc administered   10 cc discarded  Oral Contrast: NONE  Complications: None reported at time of study completion    PROCEDURE:  CTAngiography of the Chest, Abdomen and Pelvis.  Precontrast imaging was performed through the chest followed by arterial phase imaging of the chest, abdomen and pelvis.  Sagittal and coronal reformats were performed as well as 3D (MIP) reconstructions.    FINDINGS:  CHEST:  LUNGS AND LARGE AIRWAYS: Patent central airways. Dependent and basilar atelectasis. Otherwise clear lungs.  PLEURA: No pleural effusion.  VESSELS: No thoracic aortic mural hematoma, aneurysm, or dissection. No pulmonary arterial filling defects identified.  HEART: Heart size is normal. No pericardial effusion.  MEDIASTINUM AND EMELIA: Anterior chest wall cardiac device.  CHEST WALL AND LOWER NECK: Within normal limits.    ABDOMEN AND PELVIS:  LIVER: Within normal limits.  BILE DUCTS: Normal caliber.  GALLBLADDER: Within normal limits.  SPLEEN: Within normal limits.  PANCREAS: Within normal limits.  ADRENALS: Within normal limits.  KIDNEYS/URETERS: Renal cortical scarring. No hydronephrosis.    BLADDER: Within normal limits.  REPRODUCTIVE ORGANS: Uterus and adnexa within normal limits.    BOWEL: No bowel obstruction. Appendix is normal.  PERITONEUM: Trace pelvic fluid.  VESSELS: The abdominal aorta demonstrates a normal course and caliber. There is no evidence for abdominal aortic aneurysm or intraluminal flap to suggest aortic dissection. No periaortic fluid collections are seen. The origins of the celiac artery, superior mesenteric artery, bilateral renal arteries, and inferior mesenteric artery are patent. The left gastric artery arises directly from the aorta and is patent. 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  RETROPERITONEUM/LYMPH NODES: No lymphadenopathy.  ABDOMINAL WALL: Small fat-containing umbilical hernia.  BONES: Mild degenerative changes.    IMPRESSION:  No aortic aneurysm or dissection.              PIPO JONES MD; Attending Radiologist  This document has been electronically signed. Apr 14 2021  2:02PM

## 2021-04-15 NOTE — PROGRESS NOTE ADULT - ATTENDING COMMENTS
Pt notes her chest pain is persistent, stabbing in quality.  Movement does not seem to improve or worsen.  Is worsened by deep inspiration.    On exam, no rub is appreciated on cardiac exam, no murmurs.    ekg personally reviewed no ischemic changes.  troponins negative    Chest pain, possible acute pericarditis vs MSK chest pains  Echo pending.  Dr. Turk, cardiology consulted.  Change NSAIDs to IV toradol, ibuprofen was not assisting and causing nausea.  Consider colchicine as well.

## 2021-04-15 NOTE — PROGRESS NOTE ADULT - PROBLEM SELECTOR PLAN 2
H/O Mobitz Type 1 heart block unclear etiology, possibly related to hx of pericarditis  continue TELE monitoring  cardiology consulted Dr Turk

## 2021-04-16 VITALS
TEMPERATURE: 98 F | DIASTOLIC BLOOD PRESSURE: 83 MMHG | RESPIRATION RATE: 17 BRPM | SYSTOLIC BLOOD PRESSURE: 128 MMHG | OXYGEN SATURATION: 98 % | HEART RATE: 72 BPM

## 2021-04-16 LAB
ANA TITR SER: NEGATIVE — SIGNIFICANT CHANGE UP
ANION GAP SERPL CALC-SCNC: 2 MMOL/L — LOW (ref 5–17)
BUN SERPL-MCNC: 18 MG/DL — SIGNIFICANT CHANGE UP (ref 7–18)
CALCIUM SERPL-MCNC: 8.5 MG/DL — SIGNIFICANT CHANGE UP (ref 8.4–10.5)
CHLORIDE SERPL-SCNC: 107 MMOL/L — SIGNIFICANT CHANGE UP (ref 96–108)
CO2 SERPL-SCNC: 28 MMOL/L — SIGNIFICANT CHANGE UP (ref 22–31)
CREAT SERPL-MCNC: 0.93 MG/DL — SIGNIFICANT CHANGE UP (ref 0.5–1.3)
DSDNA AB SER-ACNC: <12 IU/ML — SIGNIFICANT CHANGE UP
GLUCOSE SERPL-MCNC: 74 MG/DL — SIGNIFICANT CHANGE UP (ref 70–99)
HCT VFR BLD CALC: 33.4 % — LOW (ref 34.5–45)
HGB BLD-MCNC: 10.6 G/DL — LOW (ref 11.5–15.5)
MCHC RBC-ENTMCNC: 27.6 PG — SIGNIFICANT CHANGE UP (ref 27–34)
MCHC RBC-ENTMCNC: 31.7 GM/DL — LOW (ref 32–36)
MCV RBC AUTO: 87 FL — SIGNIFICANT CHANGE UP (ref 80–100)
NRBC # BLD: 0 /100 WBCS — SIGNIFICANT CHANGE UP (ref 0–0)
PLATELET # BLD AUTO: 234 K/UL — SIGNIFICANT CHANGE UP (ref 150–400)
POTASSIUM SERPL-MCNC: 3.9 MMOL/L — SIGNIFICANT CHANGE UP (ref 3.5–5.3)
POTASSIUM SERPL-SCNC: 3.9 MMOL/L — SIGNIFICANT CHANGE UP (ref 3.5–5.3)
RBC # BLD: 3.84 M/UL — SIGNIFICANT CHANGE UP (ref 3.8–5.2)
RBC # FLD: 13.6 % — SIGNIFICANT CHANGE UP (ref 10.3–14.5)
SODIUM SERPL-SCNC: 137 MMOL/L — SIGNIFICANT CHANGE UP (ref 135–145)
WBC # BLD: 5.16 K/UL — SIGNIFICANT CHANGE UP (ref 3.8–10.5)
WBC # FLD AUTO: 5.16 K/UL — SIGNIFICANT CHANGE UP (ref 3.8–10.5)

## 2021-04-16 PROCEDURE — 87635 SARS-COV-2 COVID-19 AMP PRB: CPT

## 2021-04-16 PROCEDURE — 80048 BASIC METABOLIC PNL TOTAL CA: CPT

## 2021-04-16 PROCEDURE — 96374 THER/PROPH/DIAG INJ IV PUSH: CPT

## 2021-04-16 PROCEDURE — 85025 COMPLETE CBC W/AUTO DIFF WBC: CPT

## 2021-04-16 PROCEDURE — 86140 C-REACTIVE PROTEIN: CPT

## 2021-04-16 PROCEDURE — 83735 ASSAY OF MAGNESIUM: CPT

## 2021-04-16 PROCEDURE — 83540 ASSAY OF IRON: CPT

## 2021-04-16 PROCEDURE — 99285 EMERGENCY DEPT VISIT HI MDM: CPT

## 2021-04-16 PROCEDURE — 93306 TTE W/DOPPLER COMPLETE: CPT

## 2021-04-16 PROCEDURE — 71275 CT ANGIOGRAPHY CHEST: CPT

## 2021-04-16 PROCEDURE — 85652 RBC SED RATE AUTOMATED: CPT

## 2021-04-16 PROCEDURE — 80061 LIPID PANEL: CPT

## 2021-04-16 PROCEDURE — 82728 ASSAY OF FERRITIN: CPT

## 2021-04-16 PROCEDURE — 84466 ASSAY OF TRANSFERRIN: CPT

## 2021-04-16 PROCEDURE — 86225 DNA ANTIBODY NATIVE: CPT

## 2021-04-16 PROCEDURE — 71045 X-RAY EXAM CHEST 1 VIEW: CPT

## 2021-04-16 PROCEDURE — 96375 TX/PRO/DX INJ NEW DRUG ADDON: CPT

## 2021-04-16 PROCEDURE — 83550 IRON BINDING TEST: CPT

## 2021-04-16 PROCEDURE — 82746 ASSAY OF FOLIC ACID SERUM: CPT

## 2021-04-16 PROCEDURE — 84702 CHORIONIC GONADOTROPIN TEST: CPT

## 2021-04-16 PROCEDURE — 36415 COLL VENOUS BLD VENIPUNCTURE: CPT

## 2021-04-16 PROCEDURE — 80053 COMPREHEN METABOLIC PANEL: CPT

## 2021-04-16 PROCEDURE — 82962 GLUCOSE BLOOD TEST: CPT

## 2021-04-16 PROCEDURE — 85027 COMPLETE CBC AUTOMATED: CPT

## 2021-04-16 PROCEDURE — 84443 ASSAY THYROID STIM HORMONE: CPT

## 2021-04-16 PROCEDURE — 85379 FIBRIN DEGRADATION QUANT: CPT

## 2021-04-16 PROCEDURE — 84100 ASSAY OF PHOSPHORUS: CPT

## 2021-04-16 PROCEDURE — 86431 RHEUMATOID FACTOR QUANT: CPT

## 2021-04-16 PROCEDURE — 74174 CTA ABD&PLVS W/CONTRAST: CPT

## 2021-04-16 PROCEDURE — 99239 HOSP IP/OBS DSCHRG MGMT >30: CPT | Mod: GC

## 2021-04-16 PROCEDURE — 82607 VITAMIN B-12: CPT

## 2021-04-16 PROCEDURE — 84484 ASSAY OF TROPONIN QUANT: CPT

## 2021-04-16 PROCEDURE — 86769 SARS-COV-2 COVID-19 ANTIBODY: CPT

## 2021-04-16 PROCEDURE — 83036 HEMOGLOBIN GLYCOSYLATED A1C: CPT

## 2021-04-16 PROCEDURE — 93005 ELECTROCARDIOGRAM TRACING: CPT

## 2021-04-16 PROCEDURE — 86038 ANTINUCLEAR ANTIBODIES: CPT

## 2021-04-16 RX ORDER — PANTOPRAZOLE SODIUM 20 MG/1
1 TABLET, DELAYED RELEASE ORAL
Qty: 30 | Refills: 0
Start: 2021-04-16 | End: 2021-05-15

## 2021-04-16 RX ADMIN — Medication 15 MILLIGRAM(S): at 06:24

## 2021-04-16 RX ADMIN — Medication 15 MILLIGRAM(S): at 05:26

## 2021-04-16 RX ADMIN — Medication 15 MILLIGRAM(S): at 00:23

## 2021-04-16 NOTE — DISCHARGE NOTE NURSING/CASE MANAGEMENT/SOCIAL WORK - PATIENT PORTAL LINK FT
You can access the FollowMyHealth Patient Portal offered by [facility] by registering at the following website: [email or web address]. By joining [facility]’s FollowMyHealth portal, you will also be able to view your health information using other applications (apps) compatible with our system.

## 2021-04-16 NOTE — DISCHARGE NOTE PROVIDER - NSDCMRMEDTOKEN_GEN_ALL_CORE_FT
ibuprofen 400 mg oral tablet: 1 tab(s) orally every 6 hours, As needed, pain  meclizine 12.5 mg oral tablet: 1 tab(s) orally 3 times a day, As needed, Dizziness  pantoprazole 20 mg oral delayed release tablet: 1 tab(s) orally once a day    meclizine 12.5 mg oral tablet: 1 tab(s) orally 3 times a day, As needed, Dizziness  pantoprazole 20 mg oral delayed release tablet: 1 tab(s) orally once a day

## 2021-04-16 NOTE — DISCHARGE NOTE PROVIDER - NSDCCPCAREPLAN_GEN_ALL_CORE_FT
PRINCIPAL DISCHARGE DIAGNOSIS  Diagnosis: Chest pain, unspecified type  Assessment and Plan of Treatment: with her cardiologist and electrophysiologist       PRINCIPAL DISCHARGE DIAGNOSIS  Diagnosis: Chest pain, unspecified type  Assessment and Plan of Treatment: You presents to ED with severe chest pain, and you were admitted for chest pain and near syncope work up.   - CTA chest was negative for pulmonary embolism, aortic aneurysm or dissection.   - EKG showed sinus rhythm with Mobitz type 1 2nd degree AV block  - Troponin was negative.   - Echocardiogram showed ejection fraction of 58%.  - you were empirically treated with Ibuprofen for possible pericarditis  - you will be discharged on pantoprazole 20mg once a day for empirical treatment of possible GERD  - please follow up with your primary care physician, cardiologist and electrophysiologist within 1-2 weeks        SECONDARY DISCHARGE DIAGNOSES  Diagnosis: Mobitz type 1 second degree AV block  Assessment and Plan of Treatment: You have a history of Mobitz type 1 second degree AV block.   - Lyme's disease work up was negative in Dec, 2016.   - you were noted to have Wenckebach on telemetry, but no symptoms were present.   - you will be discharged to follow up with your cardiologist and electrophysiologist within 1-2 weeks  - please visit your nearest ER if your symptoms persist

## 2021-04-16 NOTE — DISCHARGE NOTE PROVIDER - HOSPITAL COURSE
Patient is a 30 year old female, w/ PMH of Pericarditis (diagnosed in 2016), Mobitz type I second degree AV block, has loop recorder (placed 2 yrs ago), presents to ED with chest pain since morning. Patient admitted for chest pain and near syncope work up. CTA chest was negative for pulmonary embolism, aortic aneurysm or dissection. EKG showed sinus rhythm with 1st degree AV block. Troponin was negative. Echocardiogram showed ejection fraction of 58%. Patient was empirically treated with Ibuprofen for possible pericarditis. Patient will be discharged on Pantoprazole 20mg once a day. Patient will be discharged to follow up with her cardiologist and electrophysiologist    Patient has a history of Mobitz type 1 second degree AV block.  Lyme's disease work up was negative in Dec, 2016. Patient continued to have Wenckebach on tele, but no symptoms were present. Patient will be discharged to follow up with her cardiologist and electrophysiologist.   Patient is a 30 year old female, w/ PMH of Pericarditis (diagnosed in 2016), Mobitz type I second degree AV block, has loop recorder (placed 2 yrs ago), presents to ED with chest pain since morning. Patient admitted for chest pain and near syncope work up. CTA chest was negative for pulmonary embolism, aortic aneurysm or dissection. EKG showed sinus rhythm with 1st degree AV block. Troponin was negative. Echocardiogram showed ejection fraction of 58%. Patient was empirically treated with Ibuprofen for possible pericarditis. Patient will be discharged on Pantoprazole 20mg once a day. Patient will be discharged to follow up with her cardiologist and electrophysiologist    Patient has a history of Mobitz type 1 second degree AV block.  Lyme's disease work up was negative in Dec, 2016. Patient continued to have Wenckebach on tele, but no symptoms were present. Patient will be discharged to follow up with her cardiologist and electrophysiologist Patient is a 30 year old female, w/ PMH of Pericarditis (diagnosed in 2016), Mobitz type I second degree AV block, has loop recorder (placed 2 yrs ago), presents to ED with chest pain since morning. Patient admitted for chest pain and near syncope work up. CTA chest was negative for pulmonary embolism, aortic aneurysm or dissection. EKG showed sinus rhythm with 1st degree AV block. Troponin was negative. Echocardiogram showed ejection fraction of 58%. Patient was empirically treated with Ibuprofen for possible pericarditis. Patient will be discharged on Pantoprazole 20mg once a day. Patient will be discharged to follow up with her cardiologist and electrophysiologist    Patient has a history of Mobitz type 1 second degree AV block.  Lyme's disease work up was negative in Dec, 2016. Patient continued to have Wenckebach on tele, but no symptoms were present. Patient will be discharged to follow up with her cardiologist and electrophysiologist    Attending attestation:  Case discussed with resident.  I participated in key portions of E/M examination.  I have personally seen and evaluated the patient.    Chest pain decreased slightly, now 5/10, constant, speaking with her today.  Non cardiac in origin, with negative echo (no effusion), CTA ruling our dissection or other anatomic issue, and troponins negative.  Discussed diagnostic ambiguity.  We will trial a course of PO pantoprazole to see if GERD or gastric ulcer may be source of the pain.  Should follow up closely with primary to further monitor and treat whatever the source of the pain is.    On exam, no pain to palpation of chest wall.  Normal cardiac examination.    DC time spent 35 minutes  DOS 4/16/2021  Ceasar Solis MD FACP

## 2021-04-16 NOTE — PROGRESS NOTE ADULT - SUBJECTIVE AND OBJECTIVE BOX
NP Note discussed with  primary attending    Patient is a 30y old  Female who presents with a chief complaint of Chest pain (14 Apr 2021 15:35)      INTERVAL HPI/OVERNIGHT EVENTS: no new complaints    MEDICATIONS  (STANDING):  ketorolac   Injectable 15 milliGRAM(s) IV Push every 6 hours    MEDICATIONS  (PRN):      __________________________________________________  REVIEW OF SYSTEMS:    CONSTITUTIONAL: No fever,   RESPIRATORY: No cough; No shortness of breath  CARDIOVASCULAR: + pleuritic  chest pain, no palpitations  GASTROINTESTINAL: No pain. No nausea or vomiting; No diarrhea   NEUROLOGICAL: No headache or numbness, no tremors  MUSCULOSKELETAL: No joint pain, no muscle pain  GENITOURINARY: no dysuria, no frequency, no hesitancy        Vital Signs Last 24 Hrs  T(C): 36.8 (15 Apr 2021 07:15), Max: 36.9 (14 Apr 2021 12:32)  T(F): 98.2 (15 Apr 2021 07:15), Max: 98.4 (14 Apr 2021 12:32)  HR: 65 (15 Apr 2021 07:15) (55 - 69)  BP: 138/90 (15 Apr 2021 07:15) (97/63 - 138/92)  BP(mean): --  RR: 18 (15 Apr 2021 07:15) (18 - 20)  SpO2: 99% (15 Apr 2021 07:15) (97% - 100%)    ________________________________________________  PHYSICAL EXAM:  GENERAL: NAD, obese   HEENT: Normocephalic;  conjunctivae and sclerae clear; moist mucous membranes;   NECK : supple  CHEST/LUNG: Clear to ausculitation bilaterally with good air entry   HEART: S1 S2  regular; no murmurs, gallops or rubs  ABDOMEN: Soft, Nontender, Nondistended; Bowel sounds present  EXTREMITIES: no cyanosis; no edema; no calf tenderness  SKIN: warm and dry; no rash  NERVOUS SYSTEM:  Awake and alert; Oriented  to place, person and time ; no new deficits    _________________________________________________  LABS:                        10.9   5.64  )-----------( 227      ( 15 Apr 2021 06:07 )             34.1     04-15    135  |  104  |  18  ----------------------------<  76  4.0   |  27  |  1.06    Ca    8.8      15 Apr 2021 06:07  Phos  2.9     04-15  Mg     1.8     04-15    TPro  7.1  /  Alb  3.1<L>  /  TBili  0.5  /  DBili  x   /  AST  10  /  ALT  21  /  AlkPhos  72  04-15        CAPILLARY BLOOD GLUCOSE      POCT Blood Glucose.: 82 mg/dL (14 Apr 2021 10:38)        RADIOLOGY & ADDITIONAL TESTS:    Imaging Personally Reviewed:  YES/NO    Consultant(s) Notes Reviewed:   YES/ No    Care Discussed with Consultants :     Plan of care was discussed with patient and /or primary care giver; all questions and concerns were addressed and care was aligned with patient's wishes.    
  CHIEF COMPLAINT:Patient is a 30y old  Female who presents with a chief complaint of Chest pain.Pt still having chest pain, no syncope or near syncope.    	  REVIEW OF SYSTEMS:  CONSTITUTIONAL: No fever, weight loss, or fatigue  EYES: No eye pain, visual disturbances, or discharge  ENT:  No difficulty hearing, tinnitus, vertigo; No sinus or throat pain  NECK: No pain or stiffness  RESPIRATORY: No cough, wheezing, chills or hemoptysis; No Shortness of Breath  CARDIOVASCULAR: No chest pain, palpitations, passing out, dizziness, or leg swelling  GASTROINTESTINAL: No abdominal or epigastric pain. No nausea, vomiting, or hematemesis; No diarrhea or constipation. No melena or hematochezia.  GENITOURINARY: No dysuria, frequency, hematuria, or incontinence  NEUROLOGICAL: No headaches, memory loss, loss of strength, numbness, or tremors  SKIN: No itching, burning, rashes, or lesions   LYMPH Nodes: No enlarged glands  ENDOCRINE: No heat or cold intolerance; No hair loss  MUSCULOSKELETAL: No joint pain or swelling; No muscle, back, or extremity pain  PSYCHIATRIC: No depression, anxiety, mood swings, or difficulty sleeping  HEME/LYMPH: No easy bruising, or bleeding gums  ALLERGY AND IMMUNOLOGIC: No hives or eczema	      PHYSICAL EXAM:  T(C): 36.8 (04-16-21 @ 08:11), Max: 36.9 (04-15-21 @ 15:25)  HR: 63 (04-16-21 @ 08:11) (63 - 68)  BP: 136/78 (04-16-21 @ 08:11) (110/75 - 136/78)  RR: 17 (04-16-21 @ 08:11) (17 - 18)  SpO2: 100% (04-16-21 @ 08:11) (98% - 100%)  Wt(kg): --  I&O's Summary      Appearance: Normal	  HEENT:   Normal oral mucosa, PERRL, EOMI	  Lymphatic: No lymphadenopathy  Cardiovascular: Normal S1 S2, No JVD, No murmurs, No edema  Respiratory: Lungs clear to auscultation	  Psychiatry: A & O x 3, Mood & affect appropriate  Gastrointestinal:  Soft, Non-tender, + BS	  Skin: No rashes, No ecchymoses, No cyanosis	  Neurologic: Non-focal  Extremities: Normal range of motion, No clubbing, cyanosis or edema  Vascular: Peripheral pulses palpable 2+ bilaterally    MEDICATIONS  (STANDING):  ketorolac   Injectable 15 milliGRAM(s) IV Push every 6 hours      TELEMETRY: 	nsr,mobitz type I,intermittent 2to 1 av block into 30's-asymptomatic    	  	  LABS:	 	    CARDIAC MARKERS ( 14 Apr 2021 17:29 )  <0.015 ng/mL / x     / x     / x     / x      CARDIAC MARKERS ( 14 Apr 2021 11:17 )  <0.015 ng/mL / x     / x     / x     / x                                    10.6   5.16  )-----------( 234      ( 16 Apr 2021 07:47 )             33.4     04-16    137  |  107  |  18  ----------------------------<  74  3.9   |  28  |  0.93    Ca    8.5      16 Apr 2021 07:47  Phos  2.9     04-15  Mg     1.8     04-15    TPro  7.1  /  Alb  3.1<L>  /  TBili  0.5  /  DBili  x   /  AST  10  /  ALT  21  /  AlkPhos  72  04-15    proBNP:   Lipid Profile: Cholesterol 189  LDL --  HDL 44        TSH: Thyroid Stimulating Hormone, Serum: 1.30 uU/mL (04-15 @ 06:07)      	      eh< from: Transthoracic Echocardiogram (04.15.21 @ 16:14) >  OBSERVATIONS:  Mitral Valve: Normal mitral valve. Trace mitral  regurgitation.  Aortic Root: Normal aortic root.  Aortic Valve: Normal trileaflet aortic valve. No aortic  stenosis. No aortic valve regurgitation seen.  Left Atrium: Normal left atrium.  LA volume index = 28  cc/m2.  Left Ventricle: Normal Left Ventricular Systolic Function,  (EF = 58% by biplane) Not all LV wall segments were seen.  Normal left ventricular internal dimensions and wall  thicknesses. Normal diastolic function.  Right Heart: Normal right atrium. Right ventricle not well  visualized. Normal RV systolic function (TAPSE 2.3 cm).  Normal tricuspid valve. Trace tricuspid regurgitation.  Normal pulmonic valve. Trace pulmonic insufficiency is  noted.  Pericardium/PleuraNo pericardial effusion.  Hemodynamic: Insufficient tricuspid regurgitation jet to  allow calculation of RVSP.

## 2021-04-16 NOTE — PROGRESS NOTE ADULT - ASSESSMENT
30 yr F with PMH of Pericarditis (diagnosed in 2016), Mobitz type I second degree AV block, has loop recorder (placed 2 yrs ago) presents to ED with chest pain since morning and near syncope.  1.Tele monitoring.  2.Interrogate ILR. No symptom with heart block.  3.Pt has been check for lyme disease in past .Rec R/O JOHN as outpatient.  4.Unclear etiology of chest pain-no evidence of pericarditis-recurrent.  5.GI and DVT prophylaxis.  6.Outpatient f/u with  EP.
30y old  female PMH of Pericarditis (diagnosed in 2016), Mobitz type I second degree AV block, has loop recorder   presented  with a sharp pleuritic  chest pain, worse with deep breath  and joints pain     Admitted for pleuritic chest pain to rule out pericarditis vs ACS  Trop x 2  neg, EKG 1st Degree AV block  Tele monitoring with sinus bradycardia 50-60 bpm.  CTA chest abd: no PE, no aortic aneurysm or dissection.    Pt seen at bedside reports persistent chest pain 4/10 minimal  relief  with  Motrin. No SOB, fever.

## 2021-09-18 ENCOUNTER — EMERGENCY (EMERGENCY)
Facility: HOSPITAL | Age: 31
LOS: 1 days | Discharge: HOME | End: 2021-09-18
Attending: STUDENT IN AN ORGANIZED HEALTH CARE EDUCATION/TRAINING PROGRAM | Admitting: STUDENT IN AN ORGANIZED HEALTH CARE EDUCATION/TRAINING PROGRAM
Payer: MEDICAID

## 2021-09-18 VITALS
SYSTOLIC BLOOD PRESSURE: 150 MMHG | HEART RATE: 66 BPM | DIASTOLIC BLOOD PRESSURE: 102 MMHG | OXYGEN SATURATION: 100 % | RESPIRATION RATE: 16 BRPM | TEMPERATURE: 98 F | HEIGHT: 68 IN

## 2021-09-18 VITALS
DIASTOLIC BLOOD PRESSURE: 74 MMHG | SYSTOLIC BLOOD PRESSURE: 116 MMHG | TEMPERATURE: 97 F | HEART RATE: 66 BPM | RESPIRATION RATE: 16 BRPM | OXYGEN SATURATION: 100 %

## 2021-09-18 DIAGNOSIS — Z98.891 HISTORY OF UTERINE SCAR FROM PREVIOUS SURGERY: Chronic | ICD-10-CM

## 2021-09-18 LAB
ALBUMIN SERPL ELPH-MCNC: 4.2 G/DL — SIGNIFICANT CHANGE UP (ref 3.3–5)
ALP SERPL-CCNC: 85 U/L — SIGNIFICANT CHANGE UP (ref 40–120)
ALT FLD-CCNC: 18 U/L — SIGNIFICANT CHANGE UP (ref 4–33)
ANION GAP SERPL CALC-SCNC: 12 MMOL/L — SIGNIFICANT CHANGE UP (ref 7–14)
AST SERPL-CCNC: 18 U/L — SIGNIFICANT CHANGE UP (ref 4–32)
BASOPHILS # BLD AUTO: 0.05 K/UL — SIGNIFICANT CHANGE UP (ref 0–0.2)
BASOPHILS NFR BLD AUTO: 0.7 % — SIGNIFICANT CHANGE UP (ref 0–2)
BILIRUB SERPL-MCNC: 0.5 MG/DL — SIGNIFICANT CHANGE UP (ref 0.2–1.2)
BUN SERPL-MCNC: 13 MG/DL — SIGNIFICANT CHANGE UP (ref 7–23)
CALCIUM SERPL-MCNC: 9.7 MG/DL — SIGNIFICANT CHANGE UP (ref 8.4–10.5)
CHLORIDE SERPL-SCNC: 97 MMOL/L — LOW (ref 98–107)
CO2 SERPL-SCNC: 25 MMOL/L — SIGNIFICANT CHANGE UP (ref 22–31)
CREAT SERPL-MCNC: 0.98 MG/DL — SIGNIFICANT CHANGE UP (ref 0.5–1.3)
D DIMER BLD IA.RAPID-MCNC: 184 NG/ML DDU — SIGNIFICANT CHANGE UP
EOSINOPHIL # BLD AUTO: 0.09 K/UL — SIGNIFICANT CHANGE UP (ref 0–0.5)
EOSINOPHIL NFR BLD AUTO: 1.3 % — SIGNIFICANT CHANGE UP (ref 0–6)
GLUCOSE SERPL-MCNC: 80 MG/DL — SIGNIFICANT CHANGE UP (ref 70–99)
HCT VFR BLD CALC: 32.6 % — LOW (ref 34.5–45)
HGB BLD-MCNC: 10.5 G/DL — LOW (ref 11.5–15.5)
IANC: 3.43 K/UL — SIGNIFICANT CHANGE UP (ref 1.5–8.5)
IMM GRANULOCYTES NFR BLD AUTO: 0.3 % — SIGNIFICANT CHANGE UP (ref 0–1.5)
LYMPHOCYTES # BLD AUTO: 2.62 K/UL — SIGNIFICANT CHANGE UP (ref 1–3.3)
LYMPHOCYTES # BLD AUTO: 38.8 % — SIGNIFICANT CHANGE UP (ref 13–44)
MCHC RBC-ENTMCNC: 27.6 PG — SIGNIFICANT CHANGE UP (ref 27–34)
MCHC RBC-ENTMCNC: 32.2 GM/DL — SIGNIFICANT CHANGE UP (ref 32–36)
MCV RBC AUTO: 85.6 FL — SIGNIFICANT CHANGE UP (ref 80–100)
MONOCYTES # BLD AUTO: 0.54 K/UL — SIGNIFICANT CHANGE UP (ref 0–0.9)
MONOCYTES NFR BLD AUTO: 8 % — SIGNIFICANT CHANGE UP (ref 2–14)
NEUTROPHILS # BLD AUTO: 3.43 K/UL — SIGNIFICANT CHANGE UP (ref 1.8–7.4)
NEUTROPHILS NFR BLD AUTO: 50.9 % — SIGNIFICANT CHANGE UP (ref 43–77)
NRBC # BLD: 0 /100 WBCS — SIGNIFICANT CHANGE UP
NRBC # FLD: 0 K/UL — SIGNIFICANT CHANGE UP
PLATELET # BLD AUTO: 262 K/UL — SIGNIFICANT CHANGE UP (ref 150–400)
POTASSIUM SERPL-MCNC: 3.6 MMOL/L — SIGNIFICANT CHANGE UP (ref 3.5–5.3)
POTASSIUM SERPL-SCNC: 3.6 MMOL/L — SIGNIFICANT CHANGE UP (ref 3.5–5.3)
PROT SERPL-MCNC: 7.4 G/DL — SIGNIFICANT CHANGE UP (ref 6–8.3)
RBC # BLD: 3.81 M/UL — SIGNIFICANT CHANGE UP (ref 3.8–5.2)
RBC # FLD: 13.6 % — SIGNIFICANT CHANGE UP (ref 10.3–14.5)
SARS-COV-2 RNA SPEC QL NAA+PROBE: SIGNIFICANT CHANGE UP
SODIUM SERPL-SCNC: 134 MMOL/L — LOW (ref 135–145)
TROPONIN T, HIGH SENSITIVITY RESULT: <6 NG/L — SIGNIFICANT CHANGE UP
WBC # BLD: 6.75 K/UL — SIGNIFICANT CHANGE UP (ref 3.8–10.5)
WBC # FLD AUTO: 6.75 K/UL — SIGNIFICANT CHANGE UP (ref 3.8–10.5)

## 2021-09-18 PROCEDURE — 99285 EMERGENCY DEPT VISIT HI MDM: CPT | Mod: 25

## 2021-09-18 PROCEDURE — 70450 CT HEAD/BRAIN W/O DYE: CPT | Mod: 26

## 2021-09-18 PROCEDURE — 93010 ELECTROCARDIOGRAM REPORT: CPT

## 2021-09-18 PROCEDURE — 71046 X-RAY EXAM CHEST 2 VIEWS: CPT | Mod: 26

## 2021-09-18 RX ORDER — SODIUM CHLORIDE 9 MG/ML
1000 INJECTION INTRAMUSCULAR; INTRAVENOUS; SUBCUTANEOUS ONCE
Refills: 0 | Status: COMPLETED | OUTPATIENT
Start: 2021-09-18 | End: 2021-09-18

## 2021-09-18 RX ORDER — ACETAMINOPHEN 500 MG
975 TABLET ORAL ONCE
Refills: 0 | Status: COMPLETED | OUTPATIENT
Start: 2021-09-18 | End: 2021-09-18

## 2021-09-18 RX ORDER — METOCLOPRAMIDE HCL 10 MG
10 TABLET ORAL ONCE
Refills: 0 | Status: COMPLETED | OUTPATIENT
Start: 2021-09-18 | End: 2021-09-18

## 2021-09-18 RX ADMIN — SODIUM CHLORIDE 1000 MILLILITER(S): 9 INJECTION INTRAMUSCULAR; INTRAVENOUS; SUBCUTANEOUS at 13:06

## 2021-09-18 RX ADMIN — Medication 10 MILLIGRAM(S): at 13:06

## 2021-09-18 RX ADMIN — Medication 975 MILLIGRAM(S): at 13:59

## 2021-09-18 NOTE — ED PROVIDER NOTE - OBJECTIVE STATEMENT
31yF w/pmhx pericarditis, internal loop recorder presenting with headache, chest pain, shortness of breath and near syncope x 2 days. Pt states 2 night ago she had mild posterior headache, states when she woke up yesterday the headache was more severe. She reports she has intermittent left sided chest pain but since yesterday she now has constant b/l chest pain described as pressure like. Pt has been taking Augmentin for the past week for a cat bite, she reports some abdominal cramping with an episode of nausea and vomiting last night. Pt endorses shortness or breath and near syncope. Pt reports chills but denies fever. Denies palpitations, room spinning dizziness, leg pain or swelling, recent travel, hx DVT/PE, diarrhea, cough, URI symptoms or any other concerns.

## 2021-09-18 NOTE — ED ADULT NURSE NOTE - OBJECTIVE STATEMENT
Pt received AOx4, ambulatory at baseline w. pmhx of Bipolar, AV heart block w/ loop recorder in place presents to the Ed w/ chief complaint of LS chest pain that early this morning that feels like a stabbing sensation and RS CP that occurs when taking a deep breathe and feels more as if something is sitting on her chest. Pt also endorsing headache for the past 2 days and SOB. Pt states she had  a moment in the shower earlier this morning when her vision went black and she had to sit herself down. Denies LOC, denies hitting head. Pt also reports episode of nausea and vomiting yesterday after eating something. Also reports diarrhea w/o blood from Wednesday-Friday. 20G placed in LAC.

## 2021-09-18 NOTE — ED PROVIDER NOTE - PHYSICAL EXAMINATION
Neuro: A&Ox3, PERRL, CN II-VII intact, sensation intact and equal b/l, strength 5/5 in all extremities, normal finger to nose, no gait abnormality

## 2021-09-18 NOTE — ED PROVIDER NOTE - NSFOLLOWUPINSTRUCTIONS_ED_ALL_ED_FT
You are leaving the hospital against medical advice  Please follow up with your primary care doctor and cardiologist as soon as possible  RETURN TO THE ER IMMEDIATELY with any worsening or concerning symptoms, worsening pain, shortness of breath, feeling faint, weakness, fever or any other concerns.  Take Ibuprofen 600mg every 8 hours as needed for headache or chest pain, take with food

## 2021-09-18 NOTE — ED PROVIDER NOTE - MUSCULOSKELETAL, MLM
Returned call and left message  [facility]
Went to ER he has a Bowel Obstruction. awaiting surgery appt. Would like a call back.
Spine appears normal, range of motion is not limited, no muscle or joint tenderness

## 2021-09-18 NOTE — ED PROVIDER NOTE - PROGRESS NOTE DETAILS
JOSH Licea: Spoke with cardiology, pts loop recorder can be interrogated in the morning Alvin Harvey DO - per dean, unable to perform interrogation as no staff able to perform interrogation today, can be done tomorrow AM JOSH Licea: Pt refusing to stay overnight in CDU for echo/EP/cardiology. Discussed with pt the ER workup is not complete and she continues to request to leave the ED. States she will follow up with her PMD and cardiologist. The pt is clinically sober, AA&Ox3, free from distracting injury.  Throughout our interactions in the ED today, the pt has demonstrated concrete thinking/reasoning, has maintained an orderly/reasonable conversation, appears to have intact insight/judgment/reason and therefore in our opinion has capacity to make decisions.  Given the pt’s presentation, we communicated our concern for her chest pain and near syncope. The pt verbalized an understanding of our worries. We’ve told the patient that the ED evaluation is incomplete & many troublesome conditions haven’t been r/o. We have discussed the range of possible dx, potential testing & tx options.  We’ve made  numerous efforts to prevent the pt from leaving AMA.  Our discussions included the potential outcomes of leaving AMA, including worsening of their condition, becoming permanently disabled/in pain/critically ill, or death.  Despite these efforts, we were unable to convince the pt to stay.  We have attempted to offer tx/rx/guidance for any dangerous conditions which are most likely and/or dangerous.  We have answered all questions and have implored the pt to return ASAP to complete the w/u.  A staff member witnessed the patient consenting to AMA. JOSH Licea: Spoke with cardiology, pts loop recorder can be interrogated in the morning, unable to have it performed today

## 2021-09-18 NOTE — ED PROVIDER NOTE - ATTENDING CONTRIBUTION TO CARE
I have personally performed a face to face medical and diagnostic evaluation of the patient. I have discussed with and reviewed the ACP's note and agree with the History, ROS, Physical Exam and MDM unless otherwise indicated. A brief summary of my personal evaluation and impression can be found below.    32yo F hx pericarditis, loop recorder p/w constant HA, midsternal cp, sob, and episode of near syncope x 2 days. No hx of similar ha, worse when she lays flat.      31yF w/pmhx pericarditis, internal loop recorder presenting with headache, chest pain, shortness of breath and near syncope x 2 days. Pt states 2 night ago she had mild posterior headache, states when she woke up yesterday the headache was more severe. She reports she has intermittent left sided chest pain but since yesterday she now has constant b/l chest pain described as pressure like. Pt has been taking Augmentin for the past week for a cat bite, she reports some abdominal cramping with an episode of nausea and vomiting last night. Pt endorses shortness or breath and near syncope. Pt reports chills but denies fever. Denies palpitations, room spinning dizziness, leg pain or swelling, recent travel, hx DVT/PE, diarrhea, cough, URI symptoms or any other concerns. I have personally performed a face to face medical and diagnostic evaluation of the patient. I have discussed with and reviewed the ACP's note and agree with the History, ROS, Physical Exam and MDM unless otherwise indicated. A brief summary of my personal evaluation and impression can be found below.    30yo F hx pericarditis, loop recorder p/w constant HA, midsternal cp, sob, and episode of near syncope x 2 days. No hx of similar ha, worse when she lays flat. CP is constant and nonexertional but notes it is pleuritic. Denies leg pain/swelling, recetn travel or hx dvt/pe. Has had similar episodes of cp in the past but no similar to this. Near syncope occurred while in the shower and then resolved after sitting down.  VITALS: Initial triage and subsequent vitals have been reviewed by me.  Gen: Well appearing, NAD, alert, non-toxic  Head: NCAT  HEENT: PERRL, MMM, normal conjunctiva, anicteric, neck supple  Lung: CTAB, no adventitious sounds  CV: RRR, no murmurs, 2+symmetric peripheral pulses  Abd: soft, NTND, no rebound or guarding, no palpable masses  MSK: No edema, no visible deformities  Neuro: Following commands appropriately, fluid speech, CN II-XII grossly intact. 5/5 strength and normal sensation in all extremities. Ambulatory with stable gait.  Skin: Warm and dry, no evidence of rash  Psych: normal mood and affect  Constitution of sx unclaer etiology will get CTH r/o intracranial pathology low suspicion given normal neuro exam however concerning feature of worse when laying flat. Will get labs/trop/ekg/dimer  r/o PE, acs, pericarditis, and EP to eval loop recorder given near syncopal event. Will likely req CDU for formal echo for further eval.

## 2021-09-18 NOTE — ED PROVIDER NOTE - PATIENT PORTAL LINK FT
You can access the FollowMyHealth Patient Portal offered by [facility] by registering at the following website: [email or web address]. By joining [employer]’s FollowMyHealth portal, you will also be able to view your health information using other applications (apps) compatible with our system.

## 2021-09-18 NOTE — ED PROVIDER NOTE - CLINICAL SUMMARY MEDICAL DECISION MAKING FREE TEXT BOX
31yF w/pmhx pericarditis, internal loop recorder presenting with headache, chest pain, shortness of breath and near syncope x 2 days. EKG without ischemic changes. Concern for pericarditis, PE low risk will check d-dimer, non focal neuro exam. Plan: cbc/cmp/trop/d-dimer, CXR, fluids/reglan for headache. Will reassess. 31yF w/pmhx pericarditis, internal loop recorder presenting with headache, chest pain, shortness of breath and near syncope x 2 days. EKG without ischemic changes. Concern for pericarditis, PE low risk will check d-dimer, non focal neuro exam. Plan: cbc/cmp/trop/d-dimer, CXR, fluids/reglan for headache. Will reassess. Likely CDU for echo/cards/EP if labs within normal.

## 2021-09-18 NOTE — ED PROVIDER NOTE - NSICDXPASTMEDICALHX_GEN_ALL_CORE_FT
PAST MEDICAL HISTORY:  H/O pericarditis     Pericarditis     Pre-eclampsia during second pregnancy

## 2021-09-18 NOTE — ED PROVIDER NOTE - CARE PLAN
1 Principal Discharge DX:	Chest pain  Secondary Diagnosis:	Headache  Secondary Diagnosis:	Near syncope

## 2021-09-19 PROBLEM — Z86.79 PERSONAL HISTORY OF OTHER DISEASES OF THE CIRCULATORY SYSTEM: Chronic | Status: ACTIVE | Noted: 2021-04-14

## 2022-01-08 ENCOUNTER — EMERGENCY (EMERGENCY)
Facility: HOSPITAL | Age: 32
LOS: 1 days | Discharge: HOME | End: 2022-01-08
Attending: EMERGENCY MEDICINE | Admitting: EMERGENCY MEDICINE
Payer: MEDICAID

## 2022-01-08 VITALS
HEART RATE: 62 BPM | TEMPERATURE: 98 F | OXYGEN SATURATION: 100 % | DIASTOLIC BLOOD PRESSURE: 95 MMHG | RESPIRATION RATE: 15 BRPM | SYSTOLIC BLOOD PRESSURE: 152 MMHG

## 2022-01-08 VITALS
RESPIRATION RATE: 18 BRPM | OXYGEN SATURATION: 100 % | HEIGHT: 68 IN | TEMPERATURE: 99 F | SYSTOLIC BLOOD PRESSURE: 152 MMHG | HEART RATE: 75 BPM | DIASTOLIC BLOOD PRESSURE: 92 MMHG

## 2022-01-08 DIAGNOSIS — Z98.891 HISTORY OF UTERINE SCAR FROM PREVIOUS SURGERY: Chronic | ICD-10-CM

## 2022-01-08 LAB
ALBUMIN SERPL ELPH-MCNC: 4.2 G/DL — SIGNIFICANT CHANGE UP (ref 3.3–5)
ALP SERPL-CCNC: 81 U/L — SIGNIFICANT CHANGE UP (ref 40–120)
ALT FLD-CCNC: 15 U/L — SIGNIFICANT CHANGE UP (ref 4–33)
ANION GAP SERPL CALC-SCNC: 10 MMOL/L — SIGNIFICANT CHANGE UP (ref 7–14)
AST SERPL-CCNC: 17 U/L — SIGNIFICANT CHANGE UP (ref 4–32)
BASOPHILS # BLD AUTO: 0.02 K/UL — SIGNIFICANT CHANGE UP (ref 0–0.2)
BASOPHILS NFR BLD AUTO: 0.6 % — SIGNIFICANT CHANGE UP (ref 0–2)
BILIRUB SERPL-MCNC: 0.2 MG/DL — SIGNIFICANT CHANGE UP (ref 0.2–1.2)
BUN SERPL-MCNC: 12 MG/DL — SIGNIFICANT CHANGE UP (ref 7–23)
CALCIUM SERPL-MCNC: 8.8 MG/DL — SIGNIFICANT CHANGE UP (ref 8.4–10.5)
CHLORIDE SERPL-SCNC: 105 MMOL/L — SIGNIFICANT CHANGE UP (ref 98–107)
CO2 SERPL-SCNC: 24 MMOL/L — SIGNIFICANT CHANGE UP (ref 22–31)
CREAT SERPL-MCNC: 0.96 MG/DL — SIGNIFICANT CHANGE UP (ref 0.5–1.3)
D DIMER BLD IA.RAPID-MCNC: 398 NG/ML DDU — HIGH
EOSINOPHIL # BLD AUTO: 0.06 K/UL — SIGNIFICANT CHANGE UP (ref 0–0.5)
EOSINOPHIL NFR BLD AUTO: 1.8 % — SIGNIFICANT CHANGE UP (ref 0–6)
GLUCOSE SERPL-MCNC: 86 MG/DL — SIGNIFICANT CHANGE UP (ref 70–99)
HCG SERPL-ACNC: <5 MIU/ML — SIGNIFICANT CHANGE UP
HCT VFR BLD CALC: 35.5 % — SIGNIFICANT CHANGE UP (ref 34.5–45)
HGB BLD-MCNC: 11 G/DL — LOW (ref 11.5–15.5)
IANC: 1.03 K/UL — LOW (ref 1.5–8.5)
IMM GRANULOCYTES NFR BLD AUTO: 0.3 % — SIGNIFICANT CHANGE UP (ref 0–1.5)
LIDOCAIN IGE QN: 31 U/L — SIGNIFICANT CHANGE UP (ref 7–60)
LYMPHOCYTES # BLD AUTO: 1.79 K/UL — SIGNIFICANT CHANGE UP (ref 1–3.3)
LYMPHOCYTES # BLD AUTO: 54.1 % — HIGH (ref 13–44)
MAGNESIUM SERPL-MCNC: 1.7 MG/DL — SIGNIFICANT CHANGE UP (ref 1.6–2.6)
MCHC RBC-ENTMCNC: 27.6 PG — SIGNIFICANT CHANGE UP (ref 27–34)
MCHC RBC-ENTMCNC: 31 GM/DL — LOW (ref 32–36)
MCV RBC AUTO: 89 FL — SIGNIFICANT CHANGE UP (ref 80–100)
MONOCYTES # BLD AUTO: 0.4 K/UL — SIGNIFICANT CHANGE UP (ref 0–0.9)
MONOCYTES NFR BLD AUTO: 12.1 % — SIGNIFICANT CHANGE UP (ref 2–14)
NEUTROPHILS # BLD AUTO: 1.03 K/UL — LOW (ref 1.8–7.4)
NEUTROPHILS NFR BLD AUTO: 31.1 % — LOW (ref 43–77)
NRBC # BLD: 0 /100 WBCS — SIGNIFICANT CHANGE UP
NRBC # FLD: 0 K/UL — SIGNIFICANT CHANGE UP
PLATELET # BLD AUTO: 220 K/UL — SIGNIFICANT CHANGE UP (ref 150–400)
POTASSIUM SERPL-MCNC: 4.3 MMOL/L — SIGNIFICANT CHANGE UP (ref 3.5–5.3)
POTASSIUM SERPL-SCNC: 4.3 MMOL/L — SIGNIFICANT CHANGE UP (ref 3.5–5.3)
PROT SERPL-MCNC: 6.7 G/DL — SIGNIFICANT CHANGE UP (ref 6–8.3)
RBC # BLD: 3.99 M/UL — SIGNIFICANT CHANGE UP (ref 3.8–5.2)
RBC # FLD: 14.1 % — SIGNIFICANT CHANGE UP (ref 10.3–14.5)
SARS-COV-2 RNA SPEC QL NAA+PROBE: DETECTED
SODIUM SERPL-SCNC: 139 MMOL/L — SIGNIFICANT CHANGE UP (ref 135–145)
TROPONIN T, HIGH SENSITIVITY RESULT: <6 NG/L — SIGNIFICANT CHANGE UP
WBC # BLD: 3.31 K/UL — LOW (ref 3.8–10.5)
WBC # FLD AUTO: 3.31 K/UL — LOW (ref 3.8–10.5)

## 2022-01-08 PROCEDURE — 99285 EMERGENCY DEPT VISIT HI MDM: CPT | Mod: 25

## 2022-01-08 PROCEDURE — 74177 CT ABD & PELVIS W/CONTRAST: CPT | Mod: 26,MA

## 2022-01-08 PROCEDURE — 71046 X-RAY EXAM CHEST 2 VIEWS: CPT | Mod: 26

## 2022-01-08 PROCEDURE — 93010 ELECTROCARDIOGRAM REPORT: CPT

## 2022-01-08 PROCEDURE — 71275 CT ANGIOGRAPHY CHEST: CPT | Mod: 26,MA

## 2022-01-08 RX ORDER — ACETAMINOPHEN 500 MG
975 TABLET ORAL ONCE
Refills: 0 | Status: COMPLETED | OUTPATIENT
Start: 2022-01-08 | End: 2022-01-08

## 2022-01-08 RX ORDER — SODIUM CHLORIDE 9 MG/ML
1000 INJECTION INTRAMUSCULAR; INTRAVENOUS; SUBCUTANEOUS ONCE
Refills: 0 | Status: COMPLETED | OUTPATIENT
Start: 2022-01-08 | End: 2022-01-08

## 2022-01-08 RX ORDER — KETOROLAC TROMETHAMINE 30 MG/ML
15 SYRINGE (ML) INJECTION ONCE
Refills: 0 | Status: DISCONTINUED | OUTPATIENT
Start: 2022-01-08 | End: 2022-01-08

## 2022-01-08 RX ADMIN — Medication 975 MILLIGRAM(S): at 11:10

## 2022-01-08 RX ADMIN — Medication 15 MILLIGRAM(S): at 12:37

## 2022-01-08 RX ADMIN — SODIUM CHLORIDE 1000 MILLILITER(S): 9 INJECTION INTRAMUSCULAR; INTRAVENOUS; SUBCUTANEOUS at 11:48

## 2022-01-08 RX ADMIN — Medication 15 MILLIGRAM(S): at 12:13

## 2022-01-08 RX ADMIN — Medication 975 MILLIGRAM(S): at 11:48

## 2022-01-08 NOTE — ED PROVIDER NOTE - OBJECTIVE STATEMENT
Patient is a 31 year-old-female with history of pericarditis with loop recorder presents with 1-day history of chest pain. Developed when she woke up yesterday, non-exertional, pleuritic. +cough, +right lower abdominal pain. No nausea/vomiting, no fever/chills, no shortness of breath. Not vaccinated for COVID, kids at home sick with viral syndrome. Patient is a 31 year-old-female with history of pericarditis with loop recorder and AV block presents with 1-day history of chest pain. Developed when she woke up yesterday, non-exertional, pleuritic. +cough, +right lower abdominal pain. No nausea/vomiting, no fever/chills, no shortness of breath. Not vaccinated for COVID, kids at home sick with viral syndrome. Patient is a 31 year-old-female with history of pericarditis with loop recorder and AV block presents with 1-day history of chest pain. Developed when she woke up yesterday, non-exertional, pleuritic. +cough, +right lower abdominal pain. No nausea/vomiting, no fever/chills, no shortness of breath. Not vaccinated for COVID, kids at home sick with viral syndrome.    Attendinyo female presents with pleuritic chest pain for 1 day.  also having cough.  has shortness of breath.  kids recently had covid.  pt not vaccinated. Patient is a 31 year-old-female with history of pericarditis with loop recorder and AV block presents with 1-day history of chest pain. Developed when she woke up yesterday, non-exertional, pleuritic. +cough, +right lower abdominal pain. No nausea/vomiting, no fever/chills, no shortness of breath. Not vaccinated for COVID, kids at home sick with viral syndrome. Had D&C last month in the OR and was admitted for a day.     Attendinyo female presents with pleuritic chest pain for 1 day.  also having cough.  has shortness of breath.  kids recently had covid.  pt not vaccinated.

## 2022-01-08 NOTE — ED PROVIDER NOTE - PATIENT PORTAL LINK FT
You can access the FollowMyHealth Patient Portal offered by [facility] by registering at the following website: [email or web address]. By joining [employer]’s FollowMyHealth portal, you will also be able to view your health information using other applications (apps) compatible with our system.

## 2022-01-08 NOTE — ED PROVIDER NOTE - PHYSICAL EXAMINATION
General: appears uncomfortable in pain  HEENT: atraumatic, normocephalic; pupils are equal, round and react to light, extraocular movements intact bilaterally without deficits, no conjunctival pallor, mucous membranes moist  Neck: no jugular venous distension, full range of motion  Chest/Lung: clear to auscultation bilaterally, no wheezes/rhonchi/rales  Heart: regular rate and rhythm, no murmur/gallops/rubs  Abdomen: normal bowel sounds, soft, non-distended, tenderness to palpation in right lower quadrant   Extremities: no lower extremity edema, +2 radial pulses bilaterally, +2 dorsalis pedis pulses bilaterally  Musculoskeletal: full range of motion of all 4 extremities with 5/5 strength and normal sensation   Nervous System: alert and oriented, no motor deficits or sensory deficits; CNII-XII grossly intact; no focal neurologic deficits  Skin: no rashes/lacerations noted

## 2022-01-08 NOTE — ED ADULT TRIAGE NOTE - CHIEF COMPLAINT QUOTE
Pt reporting to the Ed for cough and chest pain. children are covid +. CP increases when deep breathing. PMH AV heart block. awaiting EKG

## 2022-01-08 NOTE — ED PROVIDER NOTE - PROGRESS NOTE DETAILS
Erwin PGY1  D-dimer elevated.  in the right lower quadrant. CTA chest and CT abd/pel ordered.   Ordered toradol for pain. Erwin PGY1  ECG showed sinus rhythm with a 1st degree AV block, consistent with history of AV block. Will have patient follow up with PMD. Rip PGY1  CTA chest and CT abd/pel negative for PE or any intra-abdominal pathologies.   Will discharge home with PMD and cardiology follow up.

## 2022-01-08 NOTE — ED PROVIDER NOTE - CLINICAL SUMMARY MEDICAL DECISION MAKING FREE TEXT BOX
Patient is a 31 year-old-female with history of pericarditis with loop recorder presents with 1-day history of pleuritic chest pain, non-exertional. Son +COVID at home. Pt not vaccinated. Recent D&C. r/o ACS and PE with d-dimer. CBC, CMP, trop, d-dimer, lipase, hcg, ECG, CXR. Patient is a 31 year-old-female with history of pericarditis with loop recorder presents with 1-day history of pleuritic chest pain, non-exertional. Son +COVID at home. Pt not vaccinated. Recent D&C. r/o ACS and PE with d-dimer. CBC, CMP, trop, d-dimer, lipase, hcg, ECG, and CXR. Patient is a 31 year-old-female with history of pericarditis with loop recorder and AV block presents with 1-day history of pleuritic chest pain, non-exertional. Son +COVID at home. Pt not vaccinated. Recent D&C. r/o ACS and PE with d-dimer. CBC, CMP, trop, d-dimer, lipase, hcg, ECG, and CXR.

## 2022-01-08 NOTE — ED PROVIDER NOTE - ATTENDING WITH...
I will SWITCH the dose or number of times a day I take the medications listed below when I get home from the hospital:  None
Resident

## 2022-01-08 NOTE — ED PROVIDER NOTE - NSFOLLOWUPINSTRUCTIONS_ED_ALL_ED_FT
You were seen in the emergency department for chest pain and abdominal pain. You were given fluids, tylenol and toradol.     For pain, you may take Tylenol (acetaminophen) and/or ibuprofen (advil or motrin). Please follow the instructions on the label/container.     Please follow up with your primary care doctor within 48 hours for continuation of your care.   Please follow up with your cardiologist within 48 hours for further management of chest pain.     Return to the emergency department if you experience any new/concerning/worsening symptoms such as but not limited to: intractable nausea, vomiting, profuse sweating, worsening chest pain, shortness of breath, difficulty breathing or any other concerning symptoms.

## 2022-01-08 NOTE — ED ADULT NURSE NOTE - OBJECTIVE STATEMENT
pt c/o cp on inspiration, not feeling well.  iv placed in rt ac, labs sent off/ pt swabbed for covid

## 2022-12-17 ENCOUNTER — EMERGENCY (EMERGENCY)
Facility: HOSPITAL | Age: 32
LOS: 1 days | Discharge: HOME | End: 2022-12-17
Attending: STUDENT IN AN ORGANIZED HEALTH CARE EDUCATION/TRAINING PROGRAM | Admitting: STUDENT IN AN ORGANIZED HEALTH CARE EDUCATION/TRAINING PROGRAM
Payer: MEDICAID

## 2022-12-17 VITALS
TEMPERATURE: 99 F | DIASTOLIC BLOOD PRESSURE: 71 MMHG | RESPIRATION RATE: 16 BRPM | HEART RATE: 68 BPM | OXYGEN SATURATION: 99 % | SYSTOLIC BLOOD PRESSURE: 117 MMHG

## 2022-12-17 VITALS
HEART RATE: 60 BPM | RESPIRATION RATE: 18 BRPM | OXYGEN SATURATION: 100 % | DIASTOLIC BLOOD PRESSURE: 93 MMHG | SYSTOLIC BLOOD PRESSURE: 132 MMHG | TEMPERATURE: 99 F

## 2022-12-17 DIAGNOSIS — Z98.891 HISTORY OF UTERINE SCAR FROM PREVIOUS SURGERY: Chronic | ICD-10-CM

## 2022-12-17 LAB
ALBUMIN SERPL ELPH-MCNC: 4.2 G/DL — SIGNIFICANT CHANGE UP (ref 3.3–5)
ALP SERPL-CCNC: 88 U/L — SIGNIFICANT CHANGE UP (ref 40–120)
ALT FLD-CCNC: 15 U/L — SIGNIFICANT CHANGE UP (ref 4–33)
ANION GAP SERPL CALC-SCNC: 11 MMOL/L — SIGNIFICANT CHANGE UP (ref 7–14)
AST SERPL-CCNC: 20 U/L — SIGNIFICANT CHANGE UP (ref 4–32)
BASOPHILS # BLD AUTO: 0.03 K/UL — SIGNIFICANT CHANGE UP (ref 0–0.2)
BASOPHILS NFR BLD AUTO: 0.8 % — SIGNIFICANT CHANGE UP (ref 0–2)
BILIRUB SERPL-MCNC: 0.3 MG/DL — SIGNIFICANT CHANGE UP (ref 0.2–1.2)
BUN SERPL-MCNC: 11 MG/DL — SIGNIFICANT CHANGE UP (ref 7–23)
CALCIUM SERPL-MCNC: 8.8 MG/DL — SIGNIFICANT CHANGE UP (ref 8.4–10.5)
CHLORIDE SERPL-SCNC: 103 MMOL/L — SIGNIFICANT CHANGE UP (ref 98–107)
CK MB BLD-MCNC: <0.6 % — SIGNIFICANT CHANGE UP (ref 0–2.5)
CK MB CFR SERPL CALC: <1 NG/ML — SIGNIFICANT CHANGE UP
CK SERPL-CCNC: 160 U/L — SIGNIFICANT CHANGE UP (ref 25–170)
CO2 SERPL-SCNC: 22 MMOL/L — SIGNIFICANT CHANGE UP (ref 22–31)
CREAT SERPL-MCNC: 0.95 MG/DL — SIGNIFICANT CHANGE UP (ref 0.5–1.3)
EGFR: 81 ML/MIN/1.73M2 — SIGNIFICANT CHANGE UP
EOSINOPHIL # BLD AUTO: 0.01 K/UL — SIGNIFICANT CHANGE UP (ref 0–0.5)
EOSINOPHIL NFR BLD AUTO: 0.3 % — SIGNIFICANT CHANGE UP (ref 0–6)
FLUAV AG NPH QL: SIGNIFICANT CHANGE UP
FLUBV AG NPH QL: SIGNIFICANT CHANGE UP
GLUCOSE SERPL-MCNC: 75 MG/DL — SIGNIFICANT CHANGE UP (ref 70–99)
HCT VFR BLD CALC: 34.3 % — LOW (ref 34.5–45)
HGB BLD-MCNC: 11.3 G/DL — LOW (ref 11.5–15.5)
IANC: 1.8 K/UL — SIGNIFICANT CHANGE UP (ref 1.8–7.4)
IMM GRANULOCYTES NFR BLD AUTO: 0.3 % — SIGNIFICANT CHANGE UP (ref 0–0.9)
LYMPHOCYTES # BLD AUTO: 1.23 K/UL — SIGNIFICANT CHANGE UP (ref 1–3.3)
LYMPHOCYTES # BLD AUTO: 34.1 % — SIGNIFICANT CHANGE UP (ref 13–44)
MCHC RBC-ENTMCNC: 27.6 PG — SIGNIFICANT CHANGE UP (ref 27–34)
MCHC RBC-ENTMCNC: 32.9 GM/DL — SIGNIFICANT CHANGE UP (ref 32–36)
MCV RBC AUTO: 83.7 FL — SIGNIFICANT CHANGE UP (ref 80–100)
MONOCYTES # BLD AUTO: 0.53 K/UL — SIGNIFICANT CHANGE UP (ref 0–0.9)
MONOCYTES NFR BLD AUTO: 14.7 % — HIGH (ref 2–14)
NEUTROPHILS # BLD AUTO: 1.8 K/UL — SIGNIFICANT CHANGE UP (ref 1.8–7.4)
NEUTROPHILS NFR BLD AUTO: 49.8 % — SIGNIFICANT CHANGE UP (ref 43–77)
NRBC # BLD: 0 /100 WBCS — SIGNIFICANT CHANGE UP (ref 0–0)
NRBC # FLD: 0 K/UL — SIGNIFICANT CHANGE UP (ref 0–0)
PLATELET # BLD AUTO: 198 K/UL — SIGNIFICANT CHANGE UP (ref 150–400)
POTASSIUM SERPL-MCNC: 3.8 MMOL/L — SIGNIFICANT CHANGE UP (ref 3.5–5.3)
POTASSIUM SERPL-SCNC: 3.8 MMOL/L — SIGNIFICANT CHANGE UP (ref 3.5–5.3)
PROT SERPL-MCNC: 7.6 G/DL — SIGNIFICANT CHANGE UP (ref 6–8.3)
RBC # BLD: 4.1 M/UL — SIGNIFICANT CHANGE UP (ref 3.8–5.2)
RBC # FLD: 14.1 % — SIGNIFICANT CHANGE UP (ref 10.3–14.5)
RSV RNA NPH QL NAA+NON-PROBE: SIGNIFICANT CHANGE UP
SARS-COV-2 RNA SPEC QL NAA+PROBE: DETECTED
SODIUM SERPL-SCNC: 136 MMOL/L — SIGNIFICANT CHANGE UP (ref 135–145)
TROPONIN T, HIGH SENSITIVITY RESULT: <6 NG/L — SIGNIFICANT CHANGE UP
WBC # BLD: 3.61 K/UL — LOW (ref 3.8–10.5)
WBC # FLD AUTO: 3.61 K/UL — LOW (ref 3.8–10.5)

## 2022-12-17 PROCEDURE — 93010 ELECTROCARDIOGRAM REPORT: CPT

## 2022-12-17 PROCEDURE — 71046 X-RAY EXAM CHEST 2 VIEWS: CPT | Mod: 26

## 2022-12-17 PROCEDURE — 99285 EMERGENCY DEPT VISIT HI MDM: CPT

## 2022-12-17 RX ORDER — ACETAMINOPHEN 500 MG
975 TABLET ORAL ONCE
Refills: 0 | Status: COMPLETED | OUTPATIENT
Start: 2022-12-17 | End: 2022-12-17

## 2022-12-17 RX ORDER — METOCLOPRAMIDE HCL 10 MG
10 TABLET ORAL ONCE
Refills: 0 | Status: COMPLETED | OUTPATIENT
Start: 2022-12-17 | End: 2022-12-17

## 2022-12-17 RX ORDER — ONDANSETRON 8 MG/1
4 TABLET, FILM COATED ORAL ONCE
Refills: 0 | Status: COMPLETED | OUTPATIENT
Start: 2022-12-17 | End: 2022-12-17

## 2022-12-17 RX ORDER — SODIUM CHLORIDE 9 MG/ML
1000 INJECTION INTRAMUSCULAR; INTRAVENOUS; SUBCUTANEOUS ONCE
Refills: 0 | Status: COMPLETED | OUTPATIENT
Start: 2022-12-17 | End: 2022-12-17

## 2022-12-17 RX ORDER — IBUPROFEN 200 MG
600 TABLET ORAL ONCE
Refills: 0 | Status: COMPLETED | OUTPATIENT
Start: 2022-12-17 | End: 2022-12-17

## 2022-12-17 RX ORDER — ONDANSETRON 8 MG/1
1 TABLET, FILM COATED ORAL
Qty: 9 | Refills: 0
Start: 2022-12-17 | End: 2022-12-19

## 2022-12-17 RX ADMIN — Medication 975 MILLIGRAM(S): at 14:02

## 2022-12-17 RX ADMIN — Medication 600 MILLIGRAM(S): at 14:03

## 2022-12-17 RX ADMIN — Medication 10 MILLIGRAM(S): at 14:57

## 2022-12-17 RX ADMIN — ONDANSETRON 4 MILLIGRAM(S): 8 TABLET, FILM COATED ORAL at 14:03

## 2022-12-17 RX ADMIN — SODIUM CHLORIDE 1000 MILLILITER(S): 9 INJECTION INTRAMUSCULAR; INTRAVENOUS; SUBCUTANEOUS at 14:58

## 2022-12-17 NOTE — ED PROVIDER NOTE - OBJECTIVE STATEMENT
Attending/MD Jennifer. 31 yo F, with AV block, p/w myalgia, fever, cough, started since yesterday, tylenol at home, did not improve the symtoms, her children got sick 2wks ago, now brother 3d ago, and here visiting the ED with her brother, denies nauseous, vomit, but + dizziness and ha, chest tightness, backpain. covid unvaccinated

## 2022-12-17 NOTE — ED PROVIDER NOTE - PHYSICAL EXAMINATION
Attending/MD Jennifer.   VITALS: reviewed  GEN: No apparent distress, A & O x 4  HEAD/EYES: NC/AT, PERRL, EOMI, anicteric sclerae, no conjunctival pallor  ENT: mucus membranes moist, trachea midline, no JVD, neck is supple  RESP: lungs CTA with equal breath sounds bilaterally, chest wall nontender and atraumatic  CV: heart with reg rhythm S1, S2, no murmur; distal pulses intact and symmetric bilaterally  ABDOMEN: normoactive bowel sounds, soft, nondistended, nontender  MSK: extremities atraumatic and nontender, no edema, no asymmetry.  SKIN: warm, dry, no rash, no bruising, no cyanosis.  NEURO: alert, mentating appropriately, no facial asymmetry.  PSYCH: Affect appropriate

## 2022-12-17 NOTE — ED ADULT NURSE NOTE - OBJECTIVE STATEMENT
received pt in RW room 2m 32 yr/o female, A+Ox4, ambulatory at baseline. denies significant PMH, pt presented to the ED with brother also as patient. C/O cough, fever, chills for 3 days. endorses SOB and chest pain with exertion. pt also endorses worsening B/L LE weakness. pt has ative and Passive ROM in all extremities. pt c/o nausea, abdomen is soft, nondistended, nonteder. meds give as ordered.

## 2022-12-17 NOTE — ED PROVIDER NOTE - NSFOLLOWUPINSTRUCTIONS_ED_ALL_ED_FT
take acetaminophen 650 mg every 6 hours and ibuprofen 400 mg every 6 hours with food. Both of these medications work differently to treat pain and inflammation. Please note that these medications are both available over the counter at most pharmacies without a doctor's prescription.      COVID-19 (Coronavirus Disease 2019)    WHAT YOU NEED TO KNOW:    COVID-19 is the disease caused by a coronavirus first discovered in 2019. Coronaviruses generally cause upper respiratory (nose, throat, and lung) infections, such as a cold. The new virus can also cause serious lower respiratory conditions, such as pneumonia or acute respiratory distress syndrome (ARDS). The new virus can also affect many other organs, including the brain and heart. Blood vessels can also be affected, leading to blood clots. Anyone can develop serious problems from the new virus, but your risk is higher if you are 65 or older. A weak immune system, diabetes, or a heart or lung condition can also increase your risk. Your risk is also higher if you are a current or former cigarette smoker.    DISCHARGE INSTRUCTIONS:    If you think you or someone you know may be infected: Do the following to protect others:    If emergency care is needed, tell the  about the possible infection, or call ahead and tell the emergency department.    Call a healthcare provider for instructions if symptoms are mild. Anyone who may be infected should not arrive without calling first. The provider will need to protect staff members and other patients.    The person who may be infected needs to wear a face covering while getting medical care. This will help lower the risk of infecting others. Coverings are not used for anyone who is younger than 2 years, has breathing problems, or cannot remove it. The provider can give you instructions for anyone who cannot wear a covering.  Call your local emergency number (911 in the [place]) or an emergency department if:    You have trouble breathing or shortness of breath at rest.    You have chest pain or pressure that lasts longer than 5 minutes.    You become confused or hard to wake.    Your lips or face are blue.    You have a fever of 104°F (40°C) or higher.  Call your doctor if:    You do not have symptoms of COVID-19 but had close physical contact within 14 days with someone who tested positive.    You have questions or concerns about your condition or care.  Medicines: You may need any of the following:    Decongestants help reduce nasal congestion and help you breathe more easily. If you take decongestant pills, they may make you feel restless or cause problems with your sleep. Do not use decongestant sprays for more than a few days.    Cough suppressants help reduce coughing. Ask your healthcare provider which type of cough medicine is best for you.    Acetaminophen decreases pain and fever. It is available without a doctor's order. Ask how much to take and how often to take it. Follow directions. Read the labels of all other medicines you are using to see if they also contain acetaminophen, or ask your doctor or pharmacist. Acetaminophen can cause liver damage if not taken correctly. Do not use more than 4 grams (4,000 milligrams) total of acetaminophen in one day.    NSAIDs, such as ibuprofen, help decrease swelling, pain, and fever. NSAIDs can cause stomach bleeding or kidney problems in certain people. If you take blood thinner medicine, always ask your healthcare provider if NSAIDs are safe for you. Always read the medicine label and follow directions.    Take your medicine as directed. Contact your healthcare provider if you think your medicine is not helping or if you have side effects. Tell him or her if you are allergic to any medicine. Keep a list of the medicines, vitamins, and herbs you take. Include the amounts, and when and why you take them. Bring the list or the pill bottles to follow-up visits. Carry your medicine list with you in case of an emergency.  How the 2019 coronavirus spreads: The virus spreads quickly and easily. The virus can be passed starting 2 days before symptoms begin or before a positive test if symptoms never begin. The following are ways the virus is thought to spread, but more information may be coming:    Droplets are the main way all coronaviruses spread. The virus travels in droplets that form when a person talks, coughs, or sneezes. The droplets can also float in the air for minutes or hours. Infection happens when you breathe in the droplets or get them in your eyes or nose. Close personal contact with an infected person increases your risk for infection. This means being within 6 feet (2 meters) of the person for at least 15 minutes over 24 hours.    Person-to-person contact can spread the virus. For example, a person with the virus on his or her hands can spread it by shaking hands with someone.    The virus can stay on objects and surfaces for a short time. You may become infected by touching the object or surface and then touching your eyes or mouth.    An infected animal may be able to infect a person who touches it. This may happen at live markets or on a farm.  Help lower the risk for COVID-19: The best way to prevent infection is to avoid anyone who is infected, but this can be hard to do. An infected person can spread the virus before signs or symptoms begin, or even if signs or symptoms never develop. The following can help lower the risk for infection:  Prevent COVID-19 Infection    Wash your hands often throughout the day. Use soap and water. Rub your soapy hands together, lacing your fingers, for at least 20 seconds. Rinse with warm, running water. Dry your hands with a clean towel or paper towel. Use hand  that contains alcohol if soap and water are not available. Teach children how to wash their hands and use hand .  Handwashing      Cover sneezes and coughs. Turn your face away and cover your mouth and nose with a tissue. Throw the tissue away. Use the bend of your arm if a tissue is not available. Then wash your hands well with soap and water or use hand . Teach children how to cover a cough or sneeze.    Wear a face covering (mask) around anyone who does not live in your home. Use a disposable non-medical mask, or make a cloth covering with at least 2 layers. Cover your mouth and your nose. Securely fasten it under your chin and on the sides of your face. Do not wear a plastic face shield instead of a covering. Continue social distancing and washing your hands often. A face covering is not a substitute for social distancing safety measures.  How to Wear a Face Covering (Mask)      Follow worldwide, national, and local social distancing guidelines. Keep at least 6 feet (2 meters) between you and others. Also keep this distance from anyone who comes to your home, such as someone making a delivery.    Make a habit of not touching your face. If you get the virus on your hands, you can transfer it to your eyes, nose, or mouth and become infected. You can also transfer it to objects, surfaces, or people. Do not touch your eyes, nose, or mouth without washing your hands first.    Clean and disinfect high-touch surfaces and objects often. Use disinfecting wipes, or make a solution of 4 teaspoons of bleach in 1 quart (4 cups) of water. Clean and disinfect even if you think no one living in or coming to your home is infected with the virus.    Ask about vaccines you may need. The COVID-19 vaccine is a shot given to help prevent infection caused by the novel coronavirus. It is given to adults and children 16 years of age or older. Your healthcare provider can give you more information about when the vaccine may be available to you. Get the influenza (flu) vaccine as soon as recommended each year, usually starting in September or October. Get the pneumonia vaccine if recommended.  Follow social distancing guidelines: National and local social distancing rules vary. Rules may change over time as restrictions are lifted. Restrictions may return if an outbreak happens where you live. It is important to know and follow all current social distancing rules in your area. The following are general guidelines:    Stay home if you are sick or think you may have COVID-19. It is important to stay home if you are waiting for a testing appointment or for test results. Even if you do not have symptoms, you can pass the virus to others.    Limit trips out of your home. Have food, medicines, and other supplies delivered and left at your door or other area, if possible. Plan trips out of your home so you make the fewest stops possible to limit close personal contact. Keep track of places you go. This will help contact tracers notify others if you become infected.    Avoid close physical contact with anyone who does not live in your home. Do not shake hands with, hug, or kiss a person as a greeting. If you must use public transportation (such as a bus or subway), try to sit or stand away from others. Only allow necessary people into your home. Wear your face covering, and remind others to wear a face covering. Remind them to wash their hands when they arrive and before they leave. Do not let someone into your home or go to someone's home just to visit. Even if you both do not feel sick, the virus can pass from one of you to the other.    Avoid in-person gatherings and crowds. Gatherings or crowds of 10 or more individuals can cause the virus to spread. Avoid places such as deshpande, beaches, sporting events, and tourist attractions. For events such as parties, holiday meals, Faith services, and conferences, attend virtually (on a computer), if possible.    Ask your healthcare provider for other ways to have appointments. Some providers offer phone, video, or other types of appointments. You may also be able to get prescriptions for a few months of your medicines at a time.    Stay safe if you must go out to work. Keep physical distance between you and other workers as much as possible. Follow your employer's rules so everyone stays safe.  If you have COVID-19 and are recovering at home, healthcare providers will give you specific instructions to follow. The following are general guidelines to remind you how to keep others safe until you are well:    Wash your hands often. Use soap and water as much as possible. Use hand  that contains alcohol if soap and water are not available. Dry your hands with a clean towel or paper towel. Do not share towels with anyone. If you use paper towels, throw them away in a lined trash can kept in your room or area. Use a covered trash can, if possible.    Do not go out of your home unless it is necessary. Ask someone who is not infected to go out for groceries or supplies, or have them delivered. Do not go to your healthcare provider's office without an appointment.    Only have close physical contact with a person giving direct care, or a baby or child you must care for. Family members and friends should not visit you. If possible, stay in a separate area or room of your home if you live with others. No one should go into the area or room except to give you care. You can visit with others by phone, video chat, e-mail, or similar systems.    Wear a face covering while others are near you. This can help prevent droplets from spreading the virus when you talk, sneeze, or cough. Put the covering on before anyone comes into your room or area. Remind the person to cover his or her nose and mouth before coming in to provide care for you.    Do not share items. Do not share dishes, towels, or other items with anyone. Items need to be washed after you use them.    Protect your baby. Some newborns have tested positive for the virus. It is not known if they became infected before or after birth. The highest risk is when a  has close contact with an infected person. If you are pregnant or breastfeeding, talk to your healthcare provider or obstetrician about any concerns you have. He or she will tell you when to bring your baby in for check-ups and vaccines. He or she will also tell you what to do if you think your baby was infected with the coronavirus. Wash your hands and put on a clean face covering before you breastfeed or care for your baby.    Do not handle live animals unless it is necessary. Some animals, including pets, have been infected with the new coronavirus. Ask someone who is not infected to take care of your pet until you are well. If you must care for a pet, wear a face covering. Wash your hands before and after you give care. Talk to your healthcare provider about how to keep a service animal safe, if needed.    Follow directions from your healthcare provider for being around others after you recover. It is not known if or for how long a recovered person can pass the virus to others. Your provider may give you instructions, such as continuing social distancing and wearing a face covering. He or she will tell you when it is okay to be around others again. This may be 10 to 20 days after symptoms started or you had a positive test. Most symptoms will also need to be gone. Your provider will give you more information.  Follow up with your doctor as directed: Write down your questions so you remember to ask them during your visits.    For more information:    Centers for Disease Control and Prevention  [address] [place] [address]  Phone: 1-406.717.5127  Web Address: http://www.cdc.gov

## 2022-12-17 NOTE — ED ADULT NURSE NOTE - NSIMPLEMENTINTERV_GEN_ALL_ED
Implemented All Fall Risk Interventions:  [place] to call system. Call bell, personal items and telephone within reach. Instruct patient to call for assistance. Room bathroom lighting operational. Non-slip footwear when patient is off stretcher. Physically safe environment: no spills, clutter or unnecessary equipment. Stretcher in lowest position, wheels locked, appropriate side rails in place. Provide visual cue, wrist band, yellow gown, etc. Monitor gait and stability. Monitor for mental status changes and reorient to person, place, and time. Review medications for side effects contributing to fall risk. Reinforce activity limits and safety measures with patient and family.

## 2022-12-17 NOTE — ED ADULT TRIAGE NOTE - CHIEF COMPLAINT QUOTE
pt c/o 4 days body aches, cough, weakness, n/v, decreased po intake. pain in chest and back when coughing, denies pmh

## 2022-12-17 NOTE — ED PROVIDER NOTE - PROGRESS NOTE DETAILS
Attending/MD Jennifer. reasessed after meds, pt is still feeling dizzy, not well enough to even stand up, staying in the bed, will start ivf, reglan, labs to r/o myocarditis, initial conversation pt declines to take tamiflu or paxlovid because she does feel it is right a medication to her, but given her current condition, unvaccinated pt, even SpO2 100% at my encounter at the bedside, needs to know whther this is covid, will add on flu/covid to check covid status.

## 2022-12-17 NOTE — ED PROVIDER NOTE - CLINICAL SUMMARY MEDICAL DECISION MAKING FREE TEXT BOX
Attending/MD Jennifer. 31 yo F, with AV block, p/w viral symptoms, + sick contact, likely viral illness but given her heart history, and chest tightness, will get xr and ekg, antipyreptics and antiemesis, for symotomatic relief, will reasess, dx includes but not limited to pna, viral, myocariditis, but no neck stiffness, low suspicion for meningitic, low grade fever, less likely bacterial but pt appears discomfort at my encounter, will reaess

## 2023-02-01 NOTE — ED PROVIDER NOTE - CARE PLAN
Preparation Of Recipient Site - Flap: The eschar was removed surgically with sharp dissection to facilitate appropriate wound healing of the following adjacent tissue rearrangement. Principal Discharge DX:	Chest pain, unspecified type

## 2023-02-07 NOTE — ED ADULT TRIAGE NOTE - NS ED NOTE AC HIGH RISK COUNTRIES
No Posterior Auricular Interpolation Flap Text: A postauricular pedicle flap was designed as a staged reconstruction. An exact template was made of the defect using gentian violet and a non-adherent dressing. The template was then flattened and traced posterior to the ear in the posterior auricular sulcus. The flap was incised to the deep subcutaneous fat and elevated. Hemostasis was obtained using electrocoagulation. The flap was then defatted distally using blunt scissor dissection. The flap was then inserted distally onto the ear while remaining attached laterally. It was sewn into place using non-absorbable sutures. Careful attention was given to eversion and even approximation of the wound edges. The base and pedicle of the flap were wrapped in Vaseline impregnated gauze, and a pressure dressing was applied. The pedicle will be divided and inset in 3-4 weeks. The donor site will heal by second intention.

## 2023-05-04 NOTE — PATIENT PROFILE ADULT. - AS SC BRADEN NUTRITION
[FreeTextEntry1] : Alert.  Fully oriented.  Speech and language are intact.  Cranial nerves II-XII are intact.  Motor exam reveals intact strength with individual muscle testing in bilateral upper and lower extremities.  Tone is normal.  Sensation is intact to light touch in distal extremities.  Finger-to-nose and heel-to-shin are intact.  Rapid alternating movements are normal in the upper and lower extremities.  Gait is normal.  (3) adequate

## 2023-11-15 NOTE — ED ADULT NURSE NOTE - NSFALLRSKINDICATORS_ED_ALL_ED
no General Sunscreen Counseling: I recommended a broad spectrum sunscreen with a SPF of 50 or higher.  I explained that SPF 30 sunscreens block approximately 97 percent of the sun's harmful rays.  Sunscreens should be applied at least 15 minutes prior to expected sun exposure and then every 2 hours after that as long as sun exposure continues. If swimming or exercising sunscreen should be reapplied every 45 minutes to an hour after getting wet or sweating.  One ounce, or the equivalent of a shot glass full of sunscreen, is adequate to protect the skin not covered by a bathing suit. I also recommended a lip balm with a sunscreen as well. Sun protective clothing can be used in lieu of sunscreen but must be worn the entire time you are exposed to the sun's rays. ABCDEs/monthly SSE Detail Level: Generalized No

## 2024-02-27 NOTE — H&P ADULT - REASON FOR ADMISSION
No care due was identified.  [facility] Embedded Care Due Messages. Reference number: [id number].   2/27/2024 12:34:48 AM CST  
Refill Decision Note   Moriah Cha  is requesting a refill authorization.  Brief Assessment and Rationale for Refill:  Approve     Medication Therapy Plan:         Comments:     Note composed:3:31 AM 02/27/2024               
nausea, vomiting, cp

## 2024-06-04 NOTE — ED PROVIDER NOTE - PATIENT PORTAL LINK FT
dietitian/nutrition services
You can access the FollowMyHealth Patient Portal offered by [facility] by registering at the following website: [email or web address]. By joining [employer]’s FollowMyHealth portal, you will also be able to view your health information using other applications (apps) compatible with our system.

## 2024-06-11 NOTE — ED ADULT TRIAGE NOTE - MODE OF ARRIVAL
[facility] RN Note    Previous documentation since 5/7/24 reviewed.  No concerns reported by [facility] staff or member.      Private Vehicle

## 2024-09-06 ENCOUNTER — TRANSCRIPTION ENCOUNTER (OUTPATIENT)
Age: 34
End: 2024-09-06

## 2024-09-06 ENCOUNTER — EMERGENCY (EMERGENCY)
Facility: HOSPITAL | Age: 34
LOS: 0 days | Discharge: ANOTHER HOSPITAL | End: 2024-09-07
Attending: STUDENT IN AN ORGANIZED HEALTH CARE EDUCATION/TRAINING PROGRAM
Payer: MEDICAID

## 2024-09-06 VITALS
SYSTOLIC BLOOD PRESSURE: 160 MMHG | TEMPERATURE: 98 F | DIASTOLIC BLOOD PRESSURE: 103 MMHG | HEIGHT: 68 IN | OXYGEN SATURATION: 100 % | WEIGHT: 244.93 LBS | HEART RATE: 81 BPM | RESPIRATION RATE: 20 BRPM

## 2024-09-06 DIAGNOSIS — R07.9 CHEST PAIN, UNSPECIFIED: ICD-10-CM

## 2024-09-06 DIAGNOSIS — R00.1 BRADYCARDIA, UNSPECIFIED: ICD-10-CM

## 2024-09-06 DIAGNOSIS — R42 DIZZINESS AND GIDDINESS: ICD-10-CM

## 2024-09-06 DIAGNOSIS — Z98.891 HISTORY OF UTERINE SCAR FROM PREVIOUS SURGERY: Chronic | ICD-10-CM

## 2024-09-06 DIAGNOSIS — Z86.79 PERSONAL HISTORY OF OTHER DISEASES OF THE CIRCULATORY SYSTEM: ICD-10-CM

## 2024-09-06 DIAGNOSIS — M54.6 PAIN IN THORACIC SPINE: ICD-10-CM

## 2024-09-06 PROCEDURE — 99285 EMERGENCY DEPT VISIT HI MDM: CPT

## 2024-09-06 PROCEDURE — 93010 ELECTROCARDIOGRAM REPORT: CPT

## 2024-09-07 ENCOUNTER — RESULT REVIEW (OUTPATIENT)
Age: 34
End: 2024-09-07

## 2024-09-07 ENCOUNTER — EMERGENCY (EMERGENCY)
Facility: HOSPITAL | Age: 34
LOS: 1 days | Discharge: HOME | End: 2024-09-07
Attending: EMERGENCY MEDICINE
Payer: MEDICAID

## 2024-09-07 VITALS
SYSTOLIC BLOOD PRESSURE: 166 MMHG | RESPIRATION RATE: 18 BRPM | DIASTOLIC BLOOD PRESSURE: 85 MMHG | HEART RATE: 57 BPM | TEMPERATURE: 98 F | OXYGEN SATURATION: 100 %

## 2024-09-07 VITALS
OXYGEN SATURATION: 100 % | SYSTOLIC BLOOD PRESSURE: 128 MMHG | HEART RATE: 60 BPM | DIASTOLIC BLOOD PRESSURE: 89 MMHG | TEMPERATURE: 98 F | RESPIRATION RATE: 18 BRPM

## 2024-09-07 VITALS
DIASTOLIC BLOOD PRESSURE: 82 MMHG | OXYGEN SATURATION: 100 % | RESPIRATION RATE: 18 BRPM | HEART RATE: 74 BPM | TEMPERATURE: 98 F | SYSTOLIC BLOOD PRESSURE: 126 MMHG

## 2024-09-07 DIAGNOSIS — Z98.891 HISTORY OF UTERINE SCAR FROM PREVIOUS SURGERY: Chronic | ICD-10-CM

## 2024-09-07 PROBLEM — Z00.00 ENCOUNTER FOR PREVENTIVE HEALTH EXAMINATION: Status: ACTIVE | Noted: 2024-09-07

## 2024-09-07 LAB
ALBUMIN SERPL ELPH-MCNC: 3.5 G/DL — SIGNIFICANT CHANGE UP (ref 3.3–5)
ALBUMIN SERPL ELPH-MCNC: 3.9 G/DL — SIGNIFICANT CHANGE UP (ref 3.3–5)
ALP SERPL-CCNC: 79 U/L — SIGNIFICANT CHANGE UP (ref 40–120)
ALP SERPL-CCNC: 81 U/L — SIGNIFICANT CHANGE UP (ref 40–120)
ALT FLD-CCNC: 14 U/L — SIGNIFICANT CHANGE UP (ref 10–45)
ALT FLD-CCNC: 25 U/L — SIGNIFICANT CHANGE UP (ref 12–78)
ANION GAP SERPL CALC-SCNC: 12 MMOL/L — SIGNIFICANT CHANGE UP (ref 5–17)
ANION GAP SERPL CALC-SCNC: 5 MMOL/L — SIGNIFICANT CHANGE UP (ref 5–17)
APTT BLD: 30.4 SEC — SIGNIFICANT CHANGE UP (ref 24.5–35.6)
AST SERPL-CCNC: 12 U/L — LOW (ref 15–37)
AST SERPL-CCNC: 15 U/L — SIGNIFICANT CHANGE UP (ref 10–40)
B BURGDOR C6 AB SER-ACNC: NEGATIVE — SIGNIFICANT CHANGE UP
B BURGDOR IGG+IGM SER-ACNC: 0.2 INDEX — SIGNIFICANT CHANGE UP (ref 0.01–0.9)
BASOPHILS # BLD AUTO: 0.04 K/UL — SIGNIFICANT CHANGE UP (ref 0–0.2)
BASOPHILS # BLD AUTO: 0.05 K/UL — SIGNIFICANT CHANGE UP (ref 0–0.2)
BASOPHILS NFR BLD AUTO: 0.7 % — SIGNIFICANT CHANGE UP (ref 0–2)
BASOPHILS NFR BLD AUTO: 0.8 % — SIGNIFICANT CHANGE UP (ref 0–2)
BILIRUB SERPL-MCNC: 0.2 MG/DL — SIGNIFICANT CHANGE UP (ref 0.2–1.2)
BILIRUB SERPL-MCNC: 0.3 MG/DL — SIGNIFICANT CHANGE UP (ref 0.2–1.2)
BLD GP AB SCN SERPL QL: NEGATIVE — SIGNIFICANT CHANGE UP
BUN SERPL-MCNC: 10 MG/DL — SIGNIFICANT CHANGE UP (ref 7–23)
BUN SERPL-MCNC: 14 MG/DL — SIGNIFICANT CHANGE UP (ref 7–23)
CALCIUM SERPL-MCNC: 9 MG/DL — SIGNIFICANT CHANGE UP (ref 8.5–10.1)
CALCIUM SERPL-MCNC: 9.3 MG/DL — SIGNIFICANT CHANGE UP (ref 8.4–10.5)
CHLORIDE SERPL-SCNC: 105 MMOL/L — SIGNIFICANT CHANGE UP (ref 96–108)
CHLORIDE SERPL-SCNC: 108 MMOL/L — SIGNIFICANT CHANGE UP (ref 96–108)
CO2 SERPL-SCNC: 23 MMOL/L — SIGNIFICANT CHANGE UP (ref 22–31)
CO2 SERPL-SCNC: 26 MMOL/L — SIGNIFICANT CHANGE UP (ref 22–31)
CREAT SERPL-MCNC: 1.01 MG/DL — SIGNIFICANT CHANGE UP (ref 0.5–1.3)
CREAT SERPL-MCNC: 1.02 MG/DL — SIGNIFICANT CHANGE UP (ref 0.5–1.3)
D DIMER BLD IA.RAPID-MCNC: 243 NG/ML DDU — HIGH
EGFR: 74 ML/MIN/1.73M2 — SIGNIFICANT CHANGE UP
EGFR: 74 ML/MIN/1.73M2 — SIGNIFICANT CHANGE UP
EOSINOPHIL # BLD AUTO: 0.13 K/UL — SIGNIFICANT CHANGE UP (ref 0–0.5)
EOSINOPHIL # BLD AUTO: 0.15 K/UL — SIGNIFICANT CHANGE UP (ref 0–0.5)
EOSINOPHIL NFR BLD AUTO: 2.3 % — SIGNIFICANT CHANGE UP (ref 0–6)
EOSINOPHIL NFR BLD AUTO: 2.3 % — SIGNIFICANT CHANGE UP (ref 0–6)
ERYTHROCYTE [SEDIMENTATION RATE] IN BLOOD: 26 MM/HR — HIGH (ref 0–15)
FLUAV AG NPH QL: SIGNIFICANT CHANGE UP
FLUBV AG NPH QL: SIGNIFICANT CHANGE UP
GAS PNL BLDV: SIGNIFICANT CHANGE UP
GLUCOSE SERPL-MCNC: 87 MG/DL — SIGNIFICANT CHANGE UP (ref 70–99)
GLUCOSE SERPL-MCNC: 88 MG/DL — SIGNIFICANT CHANGE UP (ref 70–99)
HCG SERPL-ACNC: <1 MIU/ML — SIGNIFICANT CHANGE UP
HCG SERPL-ACNC: <2 MIU/ML — SIGNIFICANT CHANGE UP
HCT VFR BLD CALC: 32.1 % — LOW (ref 34.5–45)
HCT VFR BLD CALC: 32.9 % — LOW (ref 34.5–45)
HGB BLD-MCNC: 10.5 G/DL — LOW (ref 11.5–15.5)
HGB BLD-MCNC: 10.6 G/DL — LOW (ref 11.5–15.5)
IMM GRANULOCYTES NFR BLD AUTO: 0.2 % — SIGNIFICANT CHANGE UP (ref 0–0.9)
IMM GRANULOCYTES NFR BLD AUTO: 0.2 % — SIGNIFICANT CHANGE UP (ref 0–0.9)
INR BLD: 1.09 RATIO — SIGNIFICANT CHANGE UP (ref 0.85–1.18)
LYMPHOCYTES # BLD AUTO: 3.24 K/UL — SIGNIFICANT CHANGE UP (ref 1–3.3)
LYMPHOCYTES # BLD AUTO: 3.41 K/UL — HIGH (ref 1–3.3)
LYMPHOCYTES # BLD AUTO: 52.5 % — HIGH (ref 13–44)
LYMPHOCYTES # BLD AUTO: 58.4 % — HIGH (ref 13–44)
MCHC RBC-ENTMCNC: 27.7 PG — SIGNIFICANT CHANGE UP (ref 27–34)
MCHC RBC-ENTMCNC: 27.7 PG — SIGNIFICANT CHANGE UP (ref 27–34)
MCHC RBC-ENTMCNC: 31.9 GM/DL — LOW (ref 32–36)
MCHC RBC-ENTMCNC: 33 G/DL — SIGNIFICANT CHANGE UP (ref 32–36)
MCV RBC AUTO: 84 FL — SIGNIFICANT CHANGE UP (ref 80–100)
MCV RBC AUTO: 86.8 FL — SIGNIFICANT CHANGE UP (ref 80–100)
MONOCYTES # BLD AUTO: 0.41 K/UL — SIGNIFICANT CHANGE UP (ref 0–0.9)
MONOCYTES # BLD AUTO: 0.49 K/UL — SIGNIFICANT CHANGE UP (ref 0–0.9)
MONOCYTES NFR BLD AUTO: 7.4 % — SIGNIFICANT CHANGE UP (ref 2–14)
MONOCYTES NFR BLD AUTO: 7.6 % — SIGNIFICANT CHANGE UP (ref 2–14)
NEUTROPHILS # BLD AUTO: 1.72 K/UL — LOW (ref 1.8–7.4)
NEUTROPHILS # BLD AUTO: 2.38 K/UL — SIGNIFICANT CHANGE UP (ref 1.8–7.4)
NEUTROPHILS NFR BLD AUTO: 31 % — LOW (ref 43–77)
NEUTROPHILS NFR BLD AUTO: 36.6 % — LOW (ref 43–77)
NRBC # BLD: 0 /100 WBCS — SIGNIFICANT CHANGE UP (ref 0–0)
NRBC # BLD: 0 /100 WBCS — SIGNIFICANT CHANGE UP (ref 0–0)
NT-PROBNP SERPL-SCNC: 240 PG/ML — SIGNIFICANT CHANGE UP (ref 0–300)
PLATELET # BLD AUTO: 214 K/UL — SIGNIFICANT CHANGE UP (ref 150–400)
PLATELET # BLD AUTO: 217 K/UL — SIGNIFICANT CHANGE UP (ref 150–400)
POTASSIUM SERPL-MCNC: 3.4 MMOL/L — LOW (ref 3.5–5.3)
POTASSIUM SERPL-MCNC: 3.8 MMOL/L — SIGNIFICANT CHANGE UP (ref 3.5–5.3)
POTASSIUM SERPL-SCNC: 3.4 MMOL/L — LOW (ref 3.5–5.3)
POTASSIUM SERPL-SCNC: 3.8 MMOL/L — SIGNIFICANT CHANGE UP (ref 3.5–5.3)
PROT SERPL-MCNC: 7.2 G/DL — SIGNIFICANT CHANGE UP (ref 6–8.3)
PROT SERPL-MCNC: 7.3 GM/DL — SIGNIFICANT CHANGE UP (ref 6–8.3)
PROTHROM AB SERPL-ACNC: 11.4 SEC — SIGNIFICANT CHANGE UP (ref 9.5–13)
RBC # BLD: 3.79 M/UL — LOW (ref 3.8–5.2)
RBC # BLD: 3.82 M/UL — SIGNIFICANT CHANGE UP (ref 3.8–5.2)
RBC # FLD: 13.9 % — SIGNIFICANT CHANGE UP (ref 10.3–14.5)
RBC # FLD: 14.1 % — SIGNIFICANT CHANGE UP (ref 10.3–14.5)
RH IG SCN BLD-IMP: POSITIVE — SIGNIFICANT CHANGE UP
RSV RNA NPH QL NAA+NON-PROBE: SIGNIFICANT CHANGE UP
SARS-COV-2 RNA SPEC QL NAA+PROBE: SIGNIFICANT CHANGE UP
SODIUM SERPL-SCNC: 139 MMOL/L — SIGNIFICANT CHANGE UP (ref 135–145)
SODIUM SERPL-SCNC: 140 MMOL/L — SIGNIFICANT CHANGE UP (ref 135–145)
T3 SERPL-MCNC: 159 NG/DL — SIGNIFICANT CHANGE UP (ref 80–200)
T4 AB SER-ACNC: 10.1 UG/DL — SIGNIFICANT CHANGE UP (ref 4.6–12)
T4 FREE SERPL-MCNC: 1.4 NG/DL — SIGNIFICANT CHANGE UP (ref 0.9–1.8)
TROPONIN I, HIGH SENSITIVITY RESULT: 4.1 NG/L — SIGNIFICANT CHANGE UP
TROPONIN T, HIGH SENSITIVITY RESULT: <6 NG/L — SIGNIFICANT CHANGE UP (ref 0–51)
TSH SERPL-MCNC: 1.39 UIU/ML — SIGNIFICANT CHANGE UP (ref 0.27–4.2)
WBC # BLD: 5.55 K/UL — SIGNIFICANT CHANGE UP (ref 3.8–10.5)
WBC # BLD: 6.49 K/UL — SIGNIFICANT CHANGE UP (ref 3.8–10.5)
WBC # FLD AUTO: 5.55 K/UL — SIGNIFICANT CHANGE UP (ref 3.8–10.5)
WBC # FLD AUTO: 6.49 K/UL — SIGNIFICANT CHANGE UP (ref 3.8–10.5)

## 2024-09-07 PROCEDURE — 93306 TTE W/DOPPLER COMPLETE: CPT | Mod: 26

## 2024-09-07 PROCEDURE — 83880 ASSAY OF NATRIURETIC PEPTIDE: CPT

## 2024-09-07 PROCEDURE — 71275 CT ANGIOGRAPHY CHEST: CPT | Mod: 26,MC

## 2024-09-07 PROCEDURE — 87637 SARSCOV2&INF A&B&RSV AMP PRB: CPT

## 2024-09-07 PROCEDURE — 82435 ASSAY OF BLOOD CHLORIDE: CPT

## 2024-09-07 PROCEDURE — 93005 ELECTROCARDIOGRAM TRACING: CPT

## 2024-09-07 PROCEDURE — 86618 LYME DISEASE ANTIBODY: CPT

## 2024-09-07 PROCEDURE — 93306 TTE W/DOPPLER COMPLETE: CPT

## 2024-09-07 PROCEDURE — 85610 PROTHROMBIN TIME: CPT

## 2024-09-07 PROCEDURE — 86901 BLOOD TYPING SEROLOGIC RH(D): CPT

## 2024-09-07 PROCEDURE — 84132 ASSAY OF SERUM POTASSIUM: CPT

## 2024-09-07 PROCEDURE — 93308 TTE F-UP OR LMTD: CPT | Mod: 26

## 2024-09-07 PROCEDURE — 84480 ASSAY TRIIODOTHYRONINE (T3): CPT

## 2024-09-07 PROCEDURE — 86850 RBC ANTIBODY SCREEN: CPT

## 2024-09-07 PROCEDURE — 93308 TTE F-UP OR LMTD: CPT

## 2024-09-07 PROCEDURE — 84295 ASSAY OF SERUM SODIUM: CPT

## 2024-09-07 PROCEDURE — 71045 X-RAY EXAM CHEST 1 VIEW: CPT | Mod: 26

## 2024-09-07 PROCEDURE — 85018 HEMOGLOBIN: CPT

## 2024-09-07 PROCEDURE — 85025 COMPLETE CBC W/AUTO DIFF WBC: CPT

## 2024-09-07 PROCEDURE — 74174 CTA ABD&PLVS W/CONTRAST: CPT | Mod: MC

## 2024-09-07 PROCEDURE — 86038 ANTINUCLEAR ANTIBODIES: CPT

## 2024-09-07 PROCEDURE — 82947 ASSAY GLUCOSE BLOOD QUANT: CPT

## 2024-09-07 PROCEDURE — 99285 EMERGENCY DEPT VISIT HI MDM: CPT

## 2024-09-07 PROCEDURE — 80053 COMPREHEN METABOLIC PANEL: CPT

## 2024-09-07 PROCEDURE — 84484 ASSAY OF TROPONIN QUANT: CPT

## 2024-09-07 PROCEDURE — 86900 BLOOD TYPING SEROLOGIC ABO: CPT

## 2024-09-07 PROCEDURE — 71275 CT ANGIOGRAPHY CHEST: CPT | Mod: MC

## 2024-09-07 PROCEDURE — 85652 RBC SED RATE AUTOMATED: CPT

## 2024-09-07 PROCEDURE — 99285 EMERGENCY DEPT VISIT HI MDM: CPT | Mod: 25

## 2024-09-07 PROCEDURE — 36000 PLACE NEEDLE IN VEIN: CPT | Mod: XU

## 2024-09-07 PROCEDURE — 85014 HEMATOCRIT: CPT

## 2024-09-07 PROCEDURE — 71045 X-RAY EXAM CHEST 1 VIEW: CPT

## 2024-09-07 PROCEDURE — 93010 ELECTROCARDIOGRAM REPORT: CPT

## 2024-09-07 PROCEDURE — 85730 THROMBOPLASTIN TIME PARTIAL: CPT

## 2024-09-07 PROCEDURE — 83605 ASSAY OF LACTIC ACID: CPT

## 2024-09-07 PROCEDURE — 82803 BLOOD GASES ANY COMBINATION: CPT

## 2024-09-07 PROCEDURE — 84702 CHORIONIC GONADOTROPIN TEST: CPT

## 2024-09-07 PROCEDURE — 99283 EMERGENCY DEPT VISIT LOW MDM: CPT

## 2024-09-07 PROCEDURE — 82330 ASSAY OF CALCIUM: CPT

## 2024-09-07 PROCEDURE — 84439 ASSAY OF FREE THYROXINE: CPT

## 2024-09-07 PROCEDURE — 84443 ASSAY THYROID STIM HORMONE: CPT

## 2024-09-07 PROCEDURE — 74174 CTA ABD&PLVS W/CONTRAST: CPT | Mod: 26,MC

## 2024-09-07 PROCEDURE — 86140 C-REACTIVE PROTEIN: CPT

## 2024-09-07 PROCEDURE — 84436 ASSAY OF TOTAL THYROXINE: CPT

## 2024-09-07 RX ORDER — KETOROLAC TROMETHAMINE 30 MG/ML
15 INJECTION, SOLUTION INTRAMUSCULAR ONCE
Refills: 0 | Status: DISCONTINUED | OUTPATIENT
Start: 2024-09-07 | End: 2024-09-07

## 2024-09-07 RX ORDER — IBUPROFEN 600 MG
400 TABLET ORAL ONCE
Refills: 0 | Status: COMPLETED | OUTPATIENT
Start: 2024-09-07 | End: 2024-09-07

## 2024-09-07 RX ORDER — NITROGLYCERIN 0.2MG/HR
0.4 PATCH, TRANSDERMAL 24 HOURS TRANSDERMAL ONCE
Refills: 0 | Status: COMPLETED | OUTPATIENT
Start: 2024-09-07 | End: 2024-09-07

## 2024-09-07 RX ORDER — IBUPROFEN 600 MG
1 TABLET ORAL
Qty: 36 | Refills: 0
Start: 2024-09-07 | End: 2024-09-18

## 2024-09-07 RX ORDER — COLCHICINE 0.6 MG
1 TABLET ORAL ONCE
Refills: 0 | Status: DISCONTINUED | OUTPATIENT
Start: 2024-09-07 | End: 2024-09-07

## 2024-09-07 RX ORDER — SODIUM CHLORIDE 9 MG/ML
1000 INJECTION INTRAMUSCULAR; INTRAVENOUS; SUBCUTANEOUS ONCE
Refills: 0 | Status: COMPLETED | OUTPATIENT
Start: 2024-09-07 | End: 2024-09-07

## 2024-09-07 RX ORDER — POTASSIUM CHLORIDE 10 MEQ
40 TABLET, EXT RELEASE, PARTICLES/CRYSTALS ORAL ONCE
Refills: 0 | Status: COMPLETED | OUTPATIENT
Start: 2024-09-07 | End: 2024-09-07

## 2024-09-07 RX ORDER — COLCHICINE 0.6 MG
1 TABLET ORAL
Qty: 60 | Refills: 2
Start: 2024-09-07 | End: 2024-12-05

## 2024-09-07 RX ORDER — COLCHICINE 0.6 MG
1.2 TABLET ORAL ONCE
Refills: 0 | Status: COMPLETED | OUTPATIENT
Start: 2024-09-07 | End: 2024-09-07

## 2024-09-07 RX ORDER — SODIUM CHLORIDE 9 MG/ML
1000 INJECTION INTRAMUSCULAR; INTRAVENOUS; SUBCUTANEOUS
Refills: 0 | Status: DISCONTINUED | OUTPATIENT
Start: 2024-09-07 | End: 2024-09-10

## 2024-09-07 RX ADMIN — Medication 1.2 MILLIGRAM(S): at 13:44

## 2024-09-07 RX ADMIN — Medication 400 MILLIGRAM(S): at 13:44

## 2024-09-07 RX ADMIN — SODIUM CHLORIDE 75 MILLILITER(S): 9 INJECTION INTRAMUSCULAR; INTRAVENOUS; SUBCUTANEOUS at 06:56

## 2024-09-07 RX ADMIN — SODIUM CHLORIDE 1000 MILLILITER(S): 9 INJECTION INTRAMUSCULAR; INTRAVENOUS; SUBCUTANEOUS at 02:47

## 2024-09-07 RX ADMIN — KETOROLAC TROMETHAMINE 15 MILLIGRAM(S): 30 INJECTION, SOLUTION INTRAMUSCULAR at 03:21

## 2024-09-07 RX ADMIN — Medication 0.4 MILLIGRAM(S): at 06:49

## 2024-09-07 RX ADMIN — KETOROLAC TROMETHAMINE 15 MILLIGRAM(S): 30 INJECTION, SOLUTION INTRAMUSCULAR at 02:47

## 2024-09-07 RX ADMIN — Medication 40 MILLIEQUIVALENT(S): at 02:47

## 2024-09-07 NOTE — ED PROVIDER NOTE - PROGRESS NOTE DETAILS
Elmer Berry DO (PGY3)  Received signout on this patient, 34-year-old female with known Mobitz type I presenting as a transfer from [facility] for chest pain pleuritic in nature.  Patient was evaluated by cardiology overnight.  Recommending TTE.  Patient currently hemodynamically stable.  Pending CT read to rule out dissection. Elmer Berry DO (PGY3)  Spoke with cards fellow.  Concern at this point is for constrictive pathologies such as pericarditis.  Will attempt to expedite echo.  CDU is no available beds at this time. Attending Elle Adams: pt feeling better. echo without evidence of constrictive pathologies. pt afebrile. pt is wearing holter monitor. has a cardiologist to follow up. will ambulate Elmer Berry DO (PGY3)  Patient seen by Dr. Silva - Recommending d/c with Colchine or Ibuprofen - will d/c on these meds. Patient will f/u with outpatient cardiologist.

## 2024-09-07 NOTE — ED ADULT NURSE NOTE - OBJECTIVE STATEMENT
pt aox3 c/o chest pain with radiating pain left side neck.  pt hx of 2nd AV block and pericarditis.  pt endorses having a "monitor" for 2 years but her cardiologist has been monitoring the AV block with possibility of pacemaker.  pt denies n/v padmini arrythmia noted on monitor BP   WDL pt endorses pain feels "like someone is stabbing me and sitting on my chest at the same time" NKDA

## 2024-09-07 NOTE — ED ADULT NURSE NOTE - OBJECTIVE STATEMENT
Pt is 34y F with PMH second degree heart block type 1, pericarditis, transfer from [place] for chest pain. Pt reports onset of consistent L sided sharp chest pain yesterday, radiating to back and neck. Denies n/v, SOB, but reports pain is "different" from previous chest pain episodes. Reported to [facility] where pt was found to be in second degree heart block type 1, and transferred to [facility] for further cardiac evaluation and possible pacemaker placement. As per EMS, pt's HR ranging 30s - 70s bpm, normotensive en route. Received 1L NS, toradol, 40 mEq K+ PO at [facility] prior to arrival. Upon assessment, A&Ox4, endorses 6/10 L chest pain, second degree heart block type 1 ranging from 40s-70s bpm, all other vitals WNL.

## 2024-09-07 NOTE — CONSULT NOTE ADULT - ASSESSMENT
Patient is a 34 year old Female with PMH of prior Pericarditis 2016, longstanding Mobitz type I AV block s/p ILR, Bell's Palsy due to prior symptoms who was transferred from [place] due to 3 days of persistent pleuritic, positional chest pain as well as bradycardia to the 40s.     #Possible Pericarditis  #Orthostatic Hypotension  Patient with presentation very similar to multiple priors in setting of known history of pericarditis, although no ECG findings at this time but description of the pain is consistent. She also was orthostatic when getting up from stretcher with HR jump of 25 points and symptoms of dizziness. Given dizziness was not in the setting of bradycardia and her known chronic Mobitz Type I with no indication of more advanced block, I do not suspect at this dizziness is cardiac in etiology.    - please obtain orthostatic vitals, if positive, please give fluids  - recommend NSAIDs/colchicine and if that improves her pain, management as pericarditis  - obtain TTE to evaluate for pericardial fluid  - She is hemodynamically stable and should return to be seen by her outpatient EP or see a new EP cardiologist if she prefers.  - very low concern for ACS however can follow up cardiac enzymes ordered by ED and trend to peak  - can consider ESR/CRP but nonspecific      These are preliminary recommendations. Please see attending attestation for final recommendations.  Patient is a 34 year old Female with PMH of prior Pericarditis 2016, longstanding Mobitz type I AV block s/p ILR, Bell's Palsy due to prior symptoms who was transferred from [place] due to 3 days of persistent pleuritic, positional chest pain as well as bradycardia to the 40s.     #Likely recurrent Pericarditis  #Orthostatic Hypotension  Patient with presentation very similar to multiple priors in setting of known history of pericarditis, although no ECG findings at this time but description of the pain is consistent: pleuritic, positional. She also was orthostatic when getting up from stretcher with HR increase of 25 points and symptoms of dizziness. Given dizziness was not in the setting of bradycardia and her known chronic Mobitz Type I with no indication of more advanced block, I do not suspect at this dizziness is cardiac in etiology, but rather from orthostatic hypotension.  - please obtain orthostatic vitals, if positive, please give fluids  - recommend colchicine load with 1 mg BID for 1 day, then 0.5 mg BID for 3 months and she can be re-evaluated outpatient if need to increase duration for 6 months given recurrence.  - Ibuprofen 600 mg TID for 1-2 weeks and then can decrease dose by 200 mg per week  - obtain TTE  - She is hemodynamically stable and should return to be seen by her outpatient EP or see a new EP cardiologist if she prefers.  - can consider ESR/CRP but nonspecific    These are preliminary recommendations. Please see attending attestation for final recommendations.

## 2024-09-07 NOTE — ED ADULT NURSE NOTE - NSFALLCONCLUSION_ED_ALL_ED
Universal Safety Interventions
pt c/o left leg and hip pain after falling today. Denies LOC. Left leg rotated outward, no other injuries found. Negative loc.

## 2024-09-07 NOTE — ED PROVIDER NOTE - CLINICAL SUMMARY MEDICAL DECISION MAKING FREE TEXT BOX
35yo female with pmh 2nd degree heart block, pericarditis, presenting with lightheadedness and chest pain.  Has had similar in the past but over past 3 days symptoms have been more persistent happening much more frequently.  Feels like stabbing pain radiating to L upper back intermittently with pressure like sensation.  States she feels like she is passing out more frequently over past few days which is new.  Has not had syncope.  Follows with cardiology for this and has device to monitor this.  Was told if symptoms get worse she may need ppm.  ECG first degree av block.  Will get screening labs for electrolytes, trop, dimer, xr, reassess.

## 2024-09-07 NOTE — ED ADULT NURSE NOTE - NSFALLRISKINTERV_ED_ALL_ED

## 2024-09-07 NOTE — ED PROVIDER NOTE - NSFOLLOWUPINSTRUCTIONS_ED_ALL_ED_FT
- You were seen in the emergency department today for CHEST PAIN    You were sent two medications to the pharmacy.     Colchine - you should take two pills tonight (total of 1mg). Starting tomorrow you can take one pill (0.5mg twice a day).     Please take the ibuprofen as directed!    - Lab and imaging results, if performed, were discussed with you along with your discharge diagnosis    - Follow up with your doctor in 1 week - bring copies of your results if you were given. If you are supposed  to follow up with a specialist, please bring your results with you as well.     - Return to the ED for any new, worsening, or concerning symptoms to you    - Continue all prescribed medications.    - Take ibuprofen/tylenol as directed as needed for pain.     - Rest and keep yourself hydrated with fluids.    Chest Pain    Chest pain can be caused by many different conditions which may or may not be dangerous. Causes include heartburn, lung infections, heart attack, blood clot in lungs, skin infections, strain or damage to muscle, cartilage, or bones, etc. In addition to a history and physical examination, an electrocardiogram (ECG) or other lab tests may have been performed to determine the cause of your chest pain. Follow up with your primary care provider or with a cardiologist as instructed.     SEEK IMMEDIATE MEDICAL CARE IF YOU HAVE ANY OF THE FOLLOWING SYMPTOMS: worsening chest pain, coughing up blood, unexplained back/neck/jaw pain, severe abdominal pain, dizziness or lightheadedness, fainting, shortness of breath, sweaty or clammy skin, vomiting, or racing heart beat. These symptoms may represent a serious problem that is an emergency. Do not wait to see if the symptoms will go away. Get medical help right away. Call 911 and do not drive yourself to the hospital.
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
## 2024-09-07 NOTE — ED ADULT NURSE REASSESSMENT NOTE - NS ED NURSE REASSESS COMMENT FT1
Patient awaiting dispo. Laying down in stretcher, breathing is spontaneous and unlabored on room air. Skin warm dry and of color appropriate for ethnicity.

## 2024-09-07 NOTE — ED PROVIDER NOTE - PATIENT PORTAL LINK FT
You can access the FollowMyHealth Patient Portal offered by [facility] by registering at the following website: [email or web address]. By joining [employer]’s FollowMyHealth portal, you will also be able to view your health information using other applications (apps) compatible with our system.

## 2024-09-07 NOTE — CONSULT NOTE ADULT - ATTENDING COMMENTS
Echo was ok , normal LV function and no effusion  Would treat as pericarditis     recommend colchicine load with 1 mg BID for 1 day, then 0.5 mg BID for 3 months and she can be re-evaluated outpatient if need to increase duration for 6 months given recurrence.  - Ibuprofen 600 mg TID for 1-2 weeks and then can decrease dose by 200 mg per week      IF she feels better, can go home later today  IF pain not well controlled, consider observation for another 24 hours

## 2024-09-07 NOTE — ED ADULT NURSE REASSESSMENT NOTE - NS ED NURSE REASSESS COMMENT FT1
Patient is A+Ox3, sitting up in stretcher. Breathing is spontaneous and unlabored on room air. Skin warm dry and of color appropriate for ethnicity. Patient reports 6/10 chest pain at this time. Plan of care explained to patient. Pending CT results and dispo.

## 2024-09-07 NOTE — ED PROVIDER NOTE - ATTENDING CONTRIBUTION TO CARE
External records reviewed, per pt's outpatient cardiologist report (Vangie), pt has h/o pericarditis, prior , bradycardia with Mobitz 1, has indwelling internal loop recorder for 5+ years with reported refusal for pacemaker in past.  Pt sent from [facility] for chest pain and concern for heart block.  Pt presents with 3d of intermittent chest pain radiating straight to back associated with intermittent dizzy spells, slight dyspnea, and one episode of white flashes b/l eyes that resolved on its own.  No diplopia, dysphagia, dysarthria, ataxia, focal weakness in arm or leg.   Pt often gets chest pain, monthly, resolves on its own, but this is different as it is radiating straight to back.   outside ER records show slightly elevated d-dimer, cta-chest for PE negative, trop I negative.    PE: well appearing, nontoxic, no apparent distress. No nystagmus, PERRL, EOMI, CN intact, clear lungs, padmini at times, tachycardic to 90s and regular without ectopy when standing.  BP 140s/110s. Ab soft, nt.  No peripheral edema.    MDM: subacute chest pain radiating to back with elevated diastolic and dizzy spells along with postural tachycardia that seems to worsen patient's symptoms.  Consider POTS vs anxiety reaction vs demand ischemia of heart vs aortic dissection.  check cbc r/o anemia or leukocytosis, check bmp to r/o metabolic derangement and lyte imbalance, trop, repeat ct angiogram IV contrast to eval for aortic dissection (okay to obtain second iv contrast bolus as benefit >>> risk).  Trial nitroglycerin for pain.  Cards consult. STAT echocardiogram.     EK:1 AV conduction, normal ST/T waves, normal axis.  Vrate 40s.   Tele monitor when standing: sinus tachycardia 95, no ectopy, no signs of block

## 2024-09-07 NOTE — ED PROVIDER NOTE - PROGRESS NOTE DETAILS
Patient intermittently between first degree and second degree type 1.  At times 2:1 conduciton bradycardic to 40s.  Spoke with cardiology for transfer.  Initially rec reeval after meds and ivf but patient still symptomatic so will set up for transfer.

## 2024-09-07 NOTE — ED PROVIDER NOTE - CARE PROVIDER_API CALL
Nathanael Silva  Vascular Medicine  [address] [place] [address], [address] [place] [id number]  Phone: [phone]  Fax: [phone]  Follow Up Time: 4-6 Days

## 2024-09-09 ENCOUNTER — NON-APPOINTMENT (OUTPATIENT)
Age: 34
End: 2024-09-09

## 2024-09-11 ENCOUNTER — APPOINTMENT (OUTPATIENT)
Dept: OBGYN | Facility: CLINIC | Age: 34
End: 2024-09-11

## 2024-09-11 LAB — ANA TITR SER: NEGATIVE — SIGNIFICANT CHANGE UP

## 2025-05-16 ENCOUNTER — INPATIENT (INPATIENT)
Facility: HOSPITAL | Age: 35
LOS: 1 days | Discharge: HOME | End: 2025-05-18
Attending: STUDENT IN AN ORGANIZED HEALTH CARE EDUCATION/TRAINING PROGRAM | Admitting: STUDENT IN AN ORGANIZED HEALTH CARE EDUCATION/TRAINING PROGRAM
Payer: MEDICAID

## 2025-05-16 VITALS
DIASTOLIC BLOOD PRESSURE: 110 MMHG | HEIGHT: 68 IN | SYSTOLIC BLOOD PRESSURE: 158 MMHG | WEIGHT: 175.05 LBS | RESPIRATION RATE: 20 BRPM | OXYGEN SATURATION: 100 % | HEART RATE: 99 BPM | TEMPERATURE: 98 F

## 2025-05-16 DIAGNOSIS — R03.0 ELEVATED BLOOD-PRESSURE READING, WITHOUT DIAGNOSIS OF HYPERTENSION: ICD-10-CM

## 2025-05-16 DIAGNOSIS — Z98.891 HISTORY OF UTERINE SCAR FROM PREVIOUS SURGERY: Chronic | ICD-10-CM

## 2025-05-16 DIAGNOSIS — E16.2 HYPOGLYCEMIA, UNSPECIFIED: ICD-10-CM

## 2025-05-16 DIAGNOSIS — Z95.818 PRESENCE OF OTHER CARDIAC IMPLANTS AND GRAFTS: Chronic | ICD-10-CM

## 2025-05-16 DIAGNOSIS — Z86.79 PERSONAL HISTORY OF OTHER DISEASES OF THE CIRCULATORY SYSTEM: ICD-10-CM

## 2025-05-16 DIAGNOSIS — R11.2 NAUSEA WITH VOMITING, UNSPECIFIED: ICD-10-CM

## 2025-05-16 LAB
ALBUMIN SERPL ELPH-MCNC: 4 G/DL — SIGNIFICANT CHANGE UP (ref 3.3–5)
ALP SERPL-CCNC: 87 U/L — SIGNIFICANT CHANGE UP (ref 40–120)
ALT FLD-CCNC: 18 U/L — SIGNIFICANT CHANGE UP (ref 12–78)
ANION GAP SERPL CALC-SCNC: 5 MMOL/L — SIGNIFICANT CHANGE UP (ref 5–17)
AST SERPL-CCNC: 11 U/L — LOW (ref 15–37)
BASOPHILS # BLD AUTO: 0.05 K/UL — SIGNIFICANT CHANGE UP (ref 0–0.2)
BASOPHILS NFR BLD AUTO: 0.7 % — SIGNIFICANT CHANGE UP (ref 0–2)
BILIRUB SERPL-MCNC: 0.7 MG/DL — SIGNIFICANT CHANGE UP (ref 0.2–1.2)
BUN SERPL-MCNC: 16 MG/DL — SIGNIFICANT CHANGE UP (ref 7–23)
CALCIUM SERPL-MCNC: 9.4 MG/DL — SIGNIFICANT CHANGE UP (ref 8.5–10.1)
CHLORIDE SERPL-SCNC: 107 MMOL/L — SIGNIFICANT CHANGE UP (ref 96–108)
CK MB BLD-MCNC: <0.8 % — SIGNIFICANT CHANGE UP (ref 0–3.5)
CK MB CFR SERPL CALC: <1 NG/ML — SIGNIFICANT CHANGE UP (ref 0.5–3.6)
CK SERPL-CCNC: 126 U/L — SIGNIFICANT CHANGE UP (ref 26–192)
CO2 SERPL-SCNC: 23 MMOL/L — SIGNIFICANT CHANGE UP (ref 22–31)
CREAT SERPL-MCNC: 0.96 MG/DL — SIGNIFICANT CHANGE UP (ref 0.5–1.3)
EGFR: 79 ML/MIN/1.73M2 — SIGNIFICANT CHANGE UP
EGFR: 79 ML/MIN/1.73M2 — SIGNIFICANT CHANGE UP
EOSINOPHIL # BLD AUTO: 0.06 K/UL — SIGNIFICANT CHANGE UP (ref 0–0.5)
EOSINOPHIL NFR BLD AUTO: 0.8 % — SIGNIFICANT CHANGE UP (ref 0–6)
GLUCOSE BLDC GLUCOMTR-MCNC: 187 MG/DL — HIGH (ref 70–99)
GLUCOSE BLDC GLUCOMTR-MCNC: 64 MG/DL — LOW (ref 70–99)
GLUCOSE BLDC GLUCOMTR-MCNC: 64 MG/DL — LOW (ref 70–99)
GLUCOSE SERPL-MCNC: 66 MG/DL — LOW (ref 70–99)
HCG SERPL-ACNC: <1 MIU/ML — SIGNIFICANT CHANGE UP
HCT VFR BLD CALC: 36.3 % — SIGNIFICANT CHANGE UP (ref 34.5–45)
HGB BLD-MCNC: 12.1 G/DL — SIGNIFICANT CHANGE UP (ref 11.5–15.5)
IMM GRANULOCYTES NFR BLD AUTO: 0.3 % — SIGNIFICANT CHANGE UP (ref 0–0.9)
LACTATE SERPL-SCNC: 1.4 MMOL/L — SIGNIFICANT CHANGE UP (ref 0.7–2)
LIDOCAIN IGE QN: 21 U/L — SIGNIFICANT CHANGE UP (ref 13–75)
LYMPHOCYTES # BLD AUTO: 2.39 K/UL — SIGNIFICANT CHANGE UP (ref 1–3.3)
LYMPHOCYTES # BLD AUTO: 32 % — SIGNIFICANT CHANGE UP (ref 13–44)
MCHC RBC-ENTMCNC: 27.9 PG — SIGNIFICANT CHANGE UP (ref 27–34)
MCHC RBC-ENTMCNC: 33.3 G/DL — SIGNIFICANT CHANGE UP (ref 32–36)
MCV RBC AUTO: 83.6 FL — SIGNIFICANT CHANGE UP (ref 80–100)
MONOCYTES # BLD AUTO: 0.5 K/UL — SIGNIFICANT CHANGE UP (ref 0–0.9)
MONOCYTES NFR BLD AUTO: 6.7 % — SIGNIFICANT CHANGE UP (ref 2–14)
NEUTROPHILS # BLD AUTO: 4.46 K/UL — SIGNIFICANT CHANGE UP (ref 1.8–7.4)
NEUTROPHILS NFR BLD AUTO: 59.5 % — SIGNIFICANT CHANGE UP (ref 43–77)
NRBC BLD AUTO-RTO: 0 /100 WBCS — SIGNIFICANT CHANGE UP (ref 0–0)
PLATELET # BLD AUTO: 208 K/UL — SIGNIFICANT CHANGE UP (ref 150–400)
POTASSIUM SERPL-MCNC: 3.7 MMOL/L — SIGNIFICANT CHANGE UP (ref 3.5–5.3)
POTASSIUM SERPL-SCNC: 3.7 MMOL/L — SIGNIFICANT CHANGE UP (ref 3.5–5.3)
PROT SERPL-MCNC: 8.5 GM/DL — HIGH (ref 6–8.3)
RBC # BLD: 4.34 M/UL — SIGNIFICANT CHANGE UP (ref 3.8–5.2)
RBC # FLD: 13.5 % — SIGNIFICANT CHANGE UP (ref 10.3–14.5)
SODIUM SERPL-SCNC: 135 MMOL/L — SIGNIFICANT CHANGE UP (ref 135–145)
TROPONIN I, HIGH SENSITIVITY RESULT: 5.3 NG/L — SIGNIFICANT CHANGE UP
WBC # BLD: 7.48 K/UL — SIGNIFICANT CHANGE UP (ref 3.8–10.5)
WBC # FLD AUTO: 7.48 K/UL — SIGNIFICANT CHANGE UP (ref 3.8–10.5)

## 2025-05-16 PROCEDURE — 99223 1ST HOSP IP/OBS HIGH 75: CPT

## 2025-05-16 PROCEDURE — 74177 CT ABD & PELVIS W/CONTRAST: CPT | Mod: 26

## 2025-05-16 PROCEDURE — 99222 1ST HOSP IP/OBS MODERATE 55: CPT

## 2025-05-16 PROCEDURE — 93010 ELECTROCARDIOGRAM REPORT: CPT

## 2025-05-16 PROCEDURE — 76705 ECHO EXAM OF ABDOMEN: CPT | Mod: 26

## 2025-05-16 PROCEDURE — 99285 EMERGENCY DEPT VISIT HI MDM: CPT

## 2025-05-16 RX ORDER — PROCHLORPERAZINE 25 MG
10 SUPPOSITORY, RECTAL RECTAL ONCE
Refills: 0 | Status: COMPLETED | OUTPATIENT
Start: 2025-05-16 | End: 2025-05-16

## 2025-05-16 RX ORDER — ACETAMINOPHEN 500 MG/5ML
1000 LIQUID (ML) ORAL ONCE
Refills: 0 | Status: COMPLETED | OUTPATIENT
Start: 2025-05-16 | End: 2025-05-16

## 2025-05-16 RX ORDER — ACETAMINOPHEN 500 MG/5ML
650 LIQUID (ML) ORAL EVERY 6 HOURS
Refills: 0 | Status: DISCONTINUED | OUTPATIENT
Start: 2025-05-16 | End: 2025-05-18

## 2025-05-16 RX ORDER — ONDANSETRON HCL/PF 4 MG/2 ML
4 VIAL (ML) INJECTION EVERY 8 HOURS
Refills: 0 | Status: DISCONTINUED | OUTPATIENT
Start: 2025-05-16 | End: 2025-05-18

## 2025-05-16 RX ORDER — ONDANSETRON HCL/PF 4 MG/2 ML
4 VIAL (ML) INJECTION ONCE
Refills: 0 | Status: COMPLETED | OUTPATIENT
Start: 2025-05-16 | End: 2025-05-16

## 2025-05-16 RX ORDER — DEXTROSE 50 % IN WATER 50 %
25 SYRINGE (ML) INTRAVENOUS ONCE
Refills: 0 | Status: COMPLETED | OUTPATIENT
Start: 2025-05-16 | End: 2025-05-16

## 2025-05-16 RX ORDER — DEXTROSE 50 % IN WATER 50 %
25 SYRINGE (ML) INTRAVENOUS ONCE
Refills: 0 | Status: COMPLETED | OUTPATIENT
Start: 2025-05-16 | End: 2026-04-14

## 2025-05-16 RX ORDER — MELATONIN 5 MG
3 TABLET ORAL AT BEDTIME
Refills: 0 | Status: DISCONTINUED | OUTPATIENT
Start: 2025-05-16 | End: 2025-05-18

## 2025-05-16 RX ORDER — MAGNESIUM, ALUMINUM HYDROXIDE 200-200 MG
30 TABLET,CHEWABLE ORAL EVERY 4 HOURS
Refills: 0 | Status: DISCONTINUED | OUTPATIENT
Start: 2025-05-16 | End: 2025-05-18

## 2025-05-16 RX ADMIN — Medication 1000 MILLILITER(S): at 12:56

## 2025-05-16 RX ADMIN — Medication 400 MILLIGRAM(S): at 11:48

## 2025-05-16 RX ADMIN — Medication 1000 MILLILITER(S): at 11:13

## 2025-05-16 RX ADMIN — Medication 40 MILLIGRAM(S): at 11:47

## 2025-05-16 RX ADMIN — Medication 25 MILLILITER(S): at 20:53

## 2025-05-16 RX ADMIN — Medication 10 MILLIGRAM(S): at 14:55

## 2025-05-16 RX ADMIN — Medication 4 MILLIGRAM(S): at 11:48

## 2025-05-16 NOTE — H&P ADULT - ASSESSMENT
Marialuisa Natarajan is a 34 year old female with PMHx of Mobitz type I 2nd degree heart block (s/p ILR placement) and hx od pericarditis who presented to the ED on 5/16/25 for complaints of abdominal pain, nausea, and vomiting and admitted for intractable nausea and vomiting.    Intractable nausea and vomiting  Differential includes adverse medication effect vs. viral gastroenteritis?  Reports she started using Ozempic for weight loss 5 weeks ago, has had decreased appetite and intermittent nausea and vomiting since then  Started having persistent nausea and bilious emesis multiple times a day after eating turkey chili three days ago  No recent travel or known sick contacts  Associated sharp and constant RUQ and epigastric pain  CT A/P without intra-abdominal pelvic pathology  U/S gallbladder with cholelithiasis and small  R. hydronephrosis but without evidence of acute cholecystitis  S/p 2L NS bolus, acetaminophen 1 g IV, ondansetron 4 mg IV, prochlorperazine 10 mg IM, and pantoprazole 40 mg IV in the ED  Clear liquid diet started, advance diet as tolerated, antiemetics PRN  No indication for surgical intervention as per gen surg  Will need outpatient f/u with PCP to discuss adverse effects of Ozempic    Hypoglycemia secondary to decreased PO intake  Reports not eating or drinking x 3 days  Blood glucose 66 on admission  Dextrose IV x 1   F/u repeat POC    Elevated blood glucose without diagnosis of HTN, suspect secondary to pain  /110 on admission  Anticipate improvement of BP with adequate pain control  Will hold off on initiation of antihypertensives for now      Chronic medical conditions:  Mobitz type I 2nd degree heart block (s/p ILR placement): follows outpatient with cardiology    Medication reconciliation completed using med list provided by patient.

## 2025-05-16 NOTE — ED ADULT TRIAGE NOTE - CHIEF COMPLAINT QUOTE
Problem: Wound:  Goal: Will show signs of wound healing; wound closure and no evidence of infection  Description: Will show signs of wound healing; wound closure and no evidence of infection  Outcome: Not Progressing   Pt seen in [facility] - R lateral ankle with slow progress - debride per Dr. Blas - treatment as follows  and antibx reordered - augmentin  per Dr. Blas  Right Lateral Ankle  Wash wound with gentle soap and water, pat dry  Triad to ru wound  Santyl- thin layer    gauze  Cover with Dry Dressing - no bandaide - prefer gauze and tape  Change daily  Single layer Medium (F) Medigrip - place these on in the morning and removed at bedtime.    Reviewed AVS - f/u in 1 week   
Patient with complaints of chest pain that started this morning, N/V X1 day and Headache x3 days. Patient with dry cough at triage. PMx: AV Block. LMP: 2-3 weeks ago 05/01/25

## 2025-05-16 NOTE — ED ADULT NURSE NOTE - CHIEF COMPLAINT QUOTE
No Patient with complaints of chest pain that started this morning, N/V X1 day and Headache x3 days. Patient with dry cough at triage. PMx: AV Block. LMP: 2-3 weeks ago 05/01/25

## 2025-05-16 NOTE — ED ADULT NURSE NOTE - NSFALLUNIVINTERV_ED_ALL_ED
Bed/Stretcher in lowest position, wheels locked, appropriate side rails in place/Call bell, personal items and telephone in reach/Instruct patient to call for assistance before getting out of bed/chair/stretcher/Non-slip footwear applied when patient is off stretcher/[place] to call system/Physically safe environment - no spills, clutter or unnecessary equipment/Purposeful proactive rounding/Room/bathroom lighting operational, light cord in reach

## 2025-05-16 NOTE — ED PROVIDER NOTE - CLINICAL SUMMARY MEDICAL DECISION MAKING FREE TEXT BOX
34-year-old female with history of second-degree AV block, preeclampsia during second pregnancy, pericarditis presents today complaining of intractable vomiting and unable to tolerate p.o. since Wednesday.  Patient states that she could cook ground turkey birthday, woke up at 4 in the morning vomiting all the food.  No other family members sick from the food, since then patient states that she is unable to tolerate food or water, has nausea, denies fevers but experiences chills at times.  Patient also reports diffuse abdominal cramping pain, most tender in the lower abdomen, denies diarrhea.  Her exam is as noted above.  Differential diagnosis includes not limited to colitis, diverticulitis, acute cholecystitis, appendicitis.    Plan- labs, ivf, pain control, zofran, ct and sono. will reassess and dispo

## 2025-05-16 NOTE — ED PROVIDER NOTE - CONSTITUTIONAL, MLM
Pt is awake, alert, oriented to person, place, time/situation, appears generally weak, felt dizzy when she sat up, (-) respiratory distress normal...

## 2025-05-16 NOTE — H&P ADULT - NSHPLABSRESULTS_GEN_ALL_CORE
12.1   7.48  )-----------( 208      ( 16 May 2025 10:50 )             36.3     135  |  107  |  16  ----------------------------<  66[L]     05-16  3.7   |  23  |  0.96    Ca    9.4      16 May 2025 10:50    TPro  8.5[H]  /  Alb  4.0  /  TBili  0.7  /  DBili  x   /  AST  11[L]  /  ALT  18  /  AlkPhos  87  05-16    CT A/P with IV contrast 5/16/25  FINDINGS:  LOWER CHEST: Within normal limits.    LIVER: Within normal limits.  BILE DUCTS: Normal caliber.  GALLBLADDER: Within normal limits.  SPLEEN: Within normal limits.  PANCREAS: Within normal limits.  ADRENALS: Within normal limits.  KIDNEYS/URETERS: No renal stones or hydronephrosis. Malrotated right   kidney. Renal cortical scarring.    BLADDER: Within normal limits.  REPRODUCTIVE ORGANS: Anteverted uterus. 1.8 cm right adnexal corpus luteal cyst.    BOWEL: No bowel obstruction. Appendix is normal.  PERITONEUM/RETROPERITONEUM: Trace free fluid.  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  LYMPH NODES: No lymphadenopathy.  ABDOMINAL WALL: Postsurgical changes. Small fat-containing umbilical hernia.  BONES: Within normal limits.    IMPRESSION:  No acute intra-abdominal pelvic pathology.    U/S gallbladder 5/16/25  FINDINGS:  Liver: Within normal limits. Normal echogenicity.  Bile ducts: Normal intrahepatic bile duct caliber. Common bile duct measures 2 mm.  Gallbladder: The gallbladder is nondistended. Cholelithiasis with   multiple mobile gallstones. No pericholecystic fluid or gallbladder wall   thickening. Sonographic De La Vega's sign was negative.  Pancreas: Nonvisualized pancreas due to extensive overlying bowel gas.  Right kidney: 12.6 cm. Mild right hydronephrosis.  Ascites: None.  IVC: Visualized portions are within normal limits.    IMPRESSION:  Cholelithiasis. No evidence of acute cholecystitis.    Small right-sided hydronephrosis.

## 2025-05-16 NOTE — H&P ADULT - NSHPPHYSICALEXAM_GEN_ALL_CORE
T(C): 36.8 (05-16-25 @ 17:01), Max: 36.8 (05-16-25 @ 17:01)  HR: 64 (05-16-25 @ 17:01) (64 - 99)  BP: 151/98 (05-16-25 @ 17:01) (146/83 - 158/110)  RR: 18 (05-16-25 @ 17:01) (16 - 20)  SpO2: 100% (05-16-25 @ 17:01) (100% - 100%)    CONSTITUTIONAL: Well groomed, no apparent distress  EYES: PERRLA and symmetric, EOMI  ENMT: Oral mucosa with moist membranes  RESP: No respiratory distress, no use of accessory muscles; CTA b/l  CV: RRR  GI: Soft, ND, epigastric tenderness

## 2025-05-16 NOTE — ED PROVIDER NOTE - PROGRESS NOTE DETAILS
pt reassessed, pt continues to have discomfort, lying on her abdomen, surgery called for consult Patient ongoing abdominal pain PO intolerance, plan to admit

## 2025-05-16 NOTE — ED ADULT TRIAGE NOTE - HEIGHT IN FEET
5 Quinolones Counseling:  I discussed with the patient the risks of fluoroquinolones including but not limited to GI upset, allergic reaction, drug rash, diarrhea, dizziness, photosensitivity, yeast infections, liver function test abnormalities, tendonitis/tendon rupture.

## 2025-05-16 NOTE — CONSULT NOTE ADULT - SUBJECTIVE AND OBJECTIVE BOX
Patient is a 34y old  Female who presents with a chief complaint of vomiting, not tolerating PO, facial and eye swelling and puffiness, shortness of breath, headache, chest pain, chills bloating. Abdominal pain is generalized, aching on R, sharp on left. Reports some radiating into chest. Reports minimal BM, minimal PO intake. Has not urinated much. Denies fever, leg swelling.    PAST MEDICAL & SURGICAL HISTORY:  Pericarditis  Pre-eclampsia during second pregnancy  H/O pericarditis  S/P  section  x1    Review of Systems:  Negative except  as above in HPI    Allergies  No Known Drug Allergies  red meat (mainly beef) (Hives; Angioedema)  meat (Swelling)      SOCIAL HISTORY: reports prior marijuana use        Vital Signs Last 24 Hrs  T(C): 36.8 (16 May 2025 17:01), Max: 36.8 (16 May 2025 17:01)  T(F): 98.3 (16 May 2025 17:01), Max: 98.3 (16 May 2025 17:01)  HR: 64 (16 May 2025 17:01) (64 - 99)  BP: 151/98 (16 May 2025 17:01) (146/83 - 158/110)  BP(mean): --  RR: 18 (16 May 2025 17:01) (16 - 20)  SpO2: 100% (16 May 2025 17:01) (100% - 100%)    Parameters below as of 16 May 2025 11:18  Patient On (Oxygen Delivery Method): room air      Physical Exam:  General:  Appears stated age, well-groomed, lying in bed  Eyes: EOMI, conjunctiva clear  HENT:  NCAT  Chest:  respirations nonlabored  Cardiovascular:  RRR  Abdomen:  soft, nondistended, generalized TTP R>L, no rebound, no guarding, no signs of peritonitis   Extremities:  no pedal edema or calf tenderness noted  Skin:  No rash  Musculoskeletal:  normal strength  Neuro/Psych:  Alert, oriented to time, place and person     LABS:                        12.1   7.48  )-----------( 208      ( 16 May 2025 10:50 )             36.3     05-16    135  |  107  |  16  ----------------------------<  66[L]  3.7   |  23  |  0.96    Ca    9.4      16 May 2025 10:50    TPro  8.5[H]  /  Alb  4.0  /  TBili  0.7  /  DBili  x   /  AST  11[L]  /  ALT  18  /  AlkPhos  87        Urinalysis Basic - ( 16 May 2025 10:50 )    Color: x / Appearance: x / SG: x / pH: x  Gluc: 66 mg/dL / Ketone: x  / Bili: x / Urobili: x   Blood: x / Protein: x / Nitrite: x   Leuk Esterase: x / RBC: x / WBC x   Sq Epi: x / Non Sq Epi: x / Bacteria: x        RADIOLOGY & ADDITIONAL STUDIES:  < from: CT Abdomen and Pelvis w/ IV Cont (25 @ 12:02) >    ACC: [id number] EXAM:  CT ABDOMEN AND PELVIS IC   ORDERED BY: ROSALIA LANZA     PROCEDURE DATE:  2025          INTERPRETATION:  CLINICAL INFORMATION: lower abd pain and vomiting colitis    COMPARISON: 2024    CONTRAST/COMPLICATIONS:  IV Contrast: Omnipaque 350  90 cc administered   10 cc discarded  Oral Contrast: NONE  .    PROCEDURE:  CT of the Abdomen and Pelvis was performed.  Sagittal and coronal reformats were performed.    FINDINGS:  LOWER CHEST: Within normal limits.    LIVER: Within normal limits.  BILE DUCTS: Normal caliber.  GALLBLADDER: Within normal limits.  SPLEEN: Within normal limits.  PANCREAS: Within normal limits.  ADRENALS: Within normal limits.  KIDNEYS/URETERS: No renal stones or hydronephrosis. Malrotated right   kidney. Renal cortical scarring.    BLADDER: Within normal limits.  REPRODUCTIVE ORGANS: Anteverted uterus. 1.8 cm right adnexal corpus   luteal cyst.    BOWEL: No bowel obstruction. Appendix is normal.  PERITONEUM/RETROPERITONEUM: Trace free fluid.  VESSELS: Prominent hemiazygos vein with continuation with a duplicated   left sided IVC.  LYMPH NODES: No lymphadenopathy.  ABDOMINAL WALL: Postsurgical changes. Small fat-containing umbilical   hernia.  BONES: Within normal limits.    IMPRESSION:  No acute intra-abdominal pelvic pathology.        --- End of Report ---            SYLVESTER KOROMA MD, MS  This document has been electronically signed. May 16 2025 12:17PM    < end of copied text >    < from: US Gallbladder (US Gallbladder .) (25 @ 13:21) >    ACC: [id number] EXAM:  US GALLBLADDER   ORDERED BY: ROSALIA LANZA     PROCEDURE DATE:  2025          INTERPRETATION:  CLINICAL INFORMATION: Right upper quadrant pain,   cholecystitis vomiting    COMPARISON: CT abdomen pelvis 2025 and 2022    TECHNIQUE: Sonography of the right upper quadrant.    FINDINGS:    Liver: Within normal limits. Normal echogenicity.  Bile ducts: Normal intrahepatic bile duct caliber. Common bile duct   measures 2 mm.  Gallbladder: The gallbladder is nondistended. Cholelithiasis with   multiple mobile gallstones. No pericholecystic fluid or gallbladder wall   thickening. Sonographic De La Vega's sign was negative.  Pancreas: Nonvisualized pancreas due to extensive overlying bowel gas.  Right kidney: 12.6 cm. Mild right hydronephrosis.  Ascites: None.  IVC: Visualized portions are within normal limits.    IMPRESSION:    Cholelithiasis. No evidence of acute cholecystitis.    Small right-sided hydronephrosis.          --- End of Report ---            KATHY COWAN MD  This document has been electronically signed. May 16 2025  3:21PM    < end of copied text >

## 2025-05-16 NOTE — H&P ADULT - HISTORY OF PRESENT ILLNESS
Marialuisa Natarajan is a 34 year old female with PMHx of      In the ED, VSS except HR as elevated as 99 and BP as elevated as 158/110. Labs grossly unremarkable except blood glucose 66. CT A/P without intra-abdominal pelvic pathology. U/S gallbladder with cholelithiasis and small right-sided hydronephrosis but without evidence of acute cholecystitis. Received 2L NS bolus, acetaminophen 1 g IV, ondansetron 4 mg IV, prochlorperazine 10 mg IM, and pantoprazole 40 mg IV. Evaluated by general surgery who recommends admission if unable to tolerate PO but does not recommend surgical intervention.   Marialuisa Natarajan is a 34 year old female with PMHx of Mobitz type I 2nd degree heart block (s/p ILR placement) and hx od pericarditis who presented to the ED on 5/16/25 for complaints of abdominal pain, nausea, and vomiting.    Patient reports she recently started Ozempic for weight loss five weeks ago. Has had decreased appetite and intermittent nausea and vomiting since then. However, three days ago, started having nausea and bilious emesis multiple times a day after eating turkey chili. Due to this, has not eaten or drank anything for this timeframe. States her son and brother ate the same food and did not have any GI symptoms. Feels as if "stomach is sour." No recent travel or known sick contacts. Associated right upper quadrant and epigastric pain. Sharp and constant in nature. Described as feeling as if someone kicked her in the stomach. Severity was 8/10.     In the ED, VSS except HR as elevated as 99 and BP as elevated as 158/110. Labs grossly unremarkable except blood glucose 66. CT A/P without intra-abdominal pelvic pathology. U/S gallbladder with cholelithiasis and small right-sided hydronephrosis but without evidence of acute cholecystitis. Received 2L NS bolus, acetaminophen 1 g IV, ondansetron 4 mg IV, prochlorperazine 10 mg IM, and pantoprazole 40 mg IV. Evaluated by general surgery who recommends admission if unable to tolerate PO but does not recommend surgical intervention.

## 2025-05-16 NOTE — PATIENT PROFILE ADULT - FALL HARM RISK - UNIVERSAL INTERVENTIONS
Call to patient to schedule procedure in IR at [facility].  No need to be NPO.  Procedure scheduled for 12/21/2021 @ 0900, patient to arrive 1st Floor Main Imaging @ 0800.  Patient verbalized full understanding of above instructions. Department number provided for any further questions. [phone]       Bed in lowest position, wheels locked, appropriate side rails in place/Call bell, personal items and telephone in reach/Instruct patient to call for assistance before getting out of bed or chair/Non-slip footwear when patient is out of bed/[place] to call system/Physically safe environment - no spills, clutter or unnecessary equipment/Purposeful Proactive Rounding/Room/bathroom lighting operational, light cord in reach

## 2025-05-16 NOTE — CONSULT NOTE ADULT - NS ATTEND AMEND GEN_ALL_CORE FT
Patient seen and examined  Labs and imaging reviewed  Multiple complaints noted; cholelithiasis without cholecystitis on imaging  No surgical intervention required at this time  If unable to tolerate PO intake; would consider GI consultation

## 2025-05-16 NOTE — CONSULT NOTE ADULT - ASSESSMENT
34F w/ PMHx 2nd degree AV block, bipolar disorder presented w/ multiple medical complaints, vomiting, abdominal pain, chest pain, difficulty breathing, HA, chills.  CT scan w/o acute intra-abdominal pelvic pathology. GB WNL.  US showing cholelithiasis, nondistended, no pericholecystic fluid or GB wall thickening, negative ultrasonic De La Vega's sign.  VSS, no leukocytosis.    Plan as discussed w/ Dr. Mehdi Khan:  - no evidence of acute cholecystitis  - Recommend PO trial  - if tolerates can follow up as outpatient  - if unable recommend medical and GI evaluation for further workup

## 2025-05-16 NOTE — ED PROVIDER NOTE - CPE EDP NEURO NORM
Detail Level: Generalized
General Sunscreen Counseling: I recommended a broad spectrum sunscreen with a SPF of 50 or higher. Sunscreens should be applied at least 15 minutes prior to expected sun exposure and then every 2 hours after that as long as sun exposure continues. If swimming or exercising, sunscreen should be reapplied every 45 minutes to an hour after getting wet or sweating.  One ounce, or the equivalent of a shot glass full of sunscreen, is adequate to protect the skin not covered by a bathing suit. I also recommended a lip balm with a sunscreen as well. Sun protective clothing can be used in lieu of sunscreen but must be worn the entire time you are exposed to the sun's rays.
normal...

## 2025-05-16 NOTE — ED ADULT NURSE NOTE - OBJECTIVE STATEMENT
34F PMH bipolar type two, AV heart block type two presents to the ED with intermittent chest pain  c/o h/a, NV since wednesday, generaluzed abot pain. LMP 5/1. Denies FEVERS, CHILLS

## 2025-05-17 PROCEDURE — 99232 SBSQ HOSP IP/OBS MODERATE 35: CPT

## 2025-05-17 RX ADMIN — Medication 4 MILLIGRAM(S): at 09:19

## 2025-05-17 NOTE — PROGRESS NOTE ADULT - SUBJECTIVE AND OBJECTIVE BOX
SURGERY PROGRESS HPI:  Pt seen and examined at bedside. Pain well controlled, no acute events overnight      Vital Signs Last 24 Hrs  T(C): 36.9 (17 May 2025 05:18), Max: 36.9 (17 May 2025 05:18)  T(F): 98.4 (17 May 2025 05:18), Max: 98.4 (17 May 2025 05:18)  HR: 67 (17 May 2025 05:18) (62 - 99)  BP: 117/75 (17 May 2025 05:18) (117/75 - 158/110)  BP(mean): --  RR: 19 (17 May 2025 05:18) (16 - 20)  SpO2: 97% (17 May 2025 05:18) (97% - 100%)    Parameters below as of 17 May 2025 05:18  Patient On (Oxygen Delivery Method): room air          PHYSICAL EXAM:    GENERAL: NAD  CHEST/LUNG: Breathing normally  HEART: RRR   ABDOMEN: non distended, soft, non tender, no guarding  
Patient is a 34y old  Female who presents with a chief complaint of Intractable nausea and vomiting (17 May 2025 08:19)      INTERVAL HPI/OVERNIGHT EVENTS: No events. C/o nausea, slightly better. Some lower abdominal pain.     MEDICATIONS  (STANDING):    MEDICATIONS  (PRN):  acetaminophen     Tablet .. 650 milliGRAM(s) Oral every 6 hours PRN Temp greater or equal to 38C (100.4F), Mild Pain (1 - 3)  aluminum hydroxide/magnesium hydroxide/simethicone Suspension 30 milliLiter(s) Oral every 4 hours PRN Dyspepsia  melatonin 3 milliGRAM(s) Oral at bedtime PRN Insomnia  ondansetron Injectable 4 milliGRAM(s) IV Push every 8 hours PRN Nausea and/or Vomiting      Allergies    No Known Drug Allergies  red meat (mainly beef) (Hives; Angioedema)  meat (Swelling)    Intolerances        REVIEW OF SYSTEMS:  ROS negative unless stated otherwise.      Vital Signs Last 24 Hrs  T(C): 36.6 (17 May 2025 12:15), Max: 36.9 (17 May 2025 05:18)  T(F): 97.9 (17 May 2025 12:15), Max: 98.4 (17 May 2025 05:18)  HR: 65 (17 May 2025 12:15) (62 - 67)  BP: 134/89 (17 May 2025 12:15) (117/75 - 151/98)  BP(mean): --  RR: 18 (17 May 2025 12:15) (18 - 19)  SpO2: 98% (17 May 2025 12:15) (97% - 100%)    Parameters below as of 17 May 2025 12:15  Patient On (Oxygen Delivery Method): room air        PHYSICAL EXAM:  CONSTITUTIONAL: Well groomed, no apparent distress  EYES: PERRLA and symmetric, EOMI  ENMT: Oral mucosa with moist membranes  RESP: No respiratory distress, no use of accessory muscles; CTA b/l  CV: RRR  GI: Soft, ND, ttp in lower abdominal region     LABS:                        12.1   7.48  )-----------( 208      ( 16 May 2025 10:50 )             36.3     05-16    135  |  107  |  16  ----------------------------<  66[L]  3.7   |  23  |  0.96    Ca    9.4      16 May 2025 10:50    TPro  8.5[H]  /  Alb  4.0  /  TBili  0.7  /  DBili  x   /  AST  11[L]  /  ALT  18  /  AlkPhos  87  05-16      Urinalysis Basic - ( 16 May 2025 10:50 )    Color: x / Appearance: x / SG: x / pH: x  Gluc: 66 mg/dL / Ketone: x  / Bili: x / Urobili: x   Blood: x / Protein: x / Nitrite: x   Leuk Esterase: x / RBC: x / WBC x   Sq Epi: x / Non Sq Epi: x / Bacteria: x      CAPILLARY BLOOD GLUCOSE      POCT Blood Glucose.: 187 mg/dL (16 May 2025 20:18)  POCT Blood Glucose.: 64 mg/dL (16 May 2025 20:06)  POCT Blood Glucose.: 64 mg/dL (16 May 2025 20:02)      RADIOLOGY & ADDITIONAL TESTS:    Imaging Personally Reviewed:  [ X] YES  [ ] NO    Consultant(s) Notes Reviewed:  [ X] YES  [ ] NO    Care Discussed with Consultants/Other Providers [X ] YES  [ ] NO

## 2025-05-17 NOTE — PROGRESS NOTE ADULT - ASSESSMENT
Marialuisa Natarajan is a 34 year old female with PMHx of Mobitz type I 2nd degree heart block (s/p ILR placement) and hx od pericarditis who presented to the ED on 5/16/25 for complaints of abdominal pain, nausea, and vomiting and admitted for intractable nausea and vomiting.    Intractable nausea and vomiting  Differential includes adverse medication effect vs. viral gastroenteritis?  Reports she started using Ozempic for weight loss 5 weeks ago, has had decreased appetite and intermittent nausea and vomiting since then  Started having persistent nausea and bilious emesis multiple times a day after eating turkey chili three days ago  No recent travel or known sick contacts  Associated sharp and constant RUQ and epigastric pain  CT A/P without intra-abdominal pelvic pathology  U/S gallbladder with cholelithiasis and small  R. hydronephrosis but without evidence of acute cholecystitis  S/p 2L NS bolus, acetaminophen 1 g IV, ondansetron 4 mg IV, prochlorperazine 10 mg IM, and pantoprazole 40 mg IV in the ED  Clear liquid diet started, advance diet as tolerated, antiemetics PRN  No indication for surgical intervention as per gen surg  Will need outpatient f/u with PCP to discuss adverse effects of Ozempic    Hypoglycemia secondary to decreased PO intake  Reports not eating or drinking x 3 days  Blood glucose 66 on admission  Dextrose IV x 1   POC glucose improved      Elevated blood glucose without diagnosis of HTN, suspect secondary to pain  /110 on admission  Anticipate improvement of BP with adequate pain control  Will hold off on initiation of antihypertensives for now  improved      Chronic medical conditions:  Mobitz type I 2nd degree heart block (s/p ILR placement): follows outpatient with cardiology    Medication reconciliation completed using med list provided by patient.
34F w/ PMHx 2nd degree AV block, bipolar disorder presented w/ multiple medical complaints, vomiting, abdominal pain, chest pain, difficulty breathing, HA, chills.  no evidence of acute cholecystitis  no acute surgical intervention  continue medical management

## 2025-05-17 NOTE — PROGRESS NOTE ADULT - NS ATTEND AMEND GEN_ALL_CORE FT
Patient seen and examined with PA  Complains of nausea, right lower quadrant pain  On exam; awake, alert and oriented  No scleral icterus  Breathing comfortably on room air  Abd is soft, not distended, no right upper quadrant tenderness on exam. Area of patients concern right lower quadrant, mild discomfort in that area.  No rebound, no guarding.    No emergent general surgery intervention required at this time  Will sign off, please reconsult as needed Patient seen and examined with PA  Complains of nausea, right lower quadrant pain    On exam; awake, alert and oriented  No scleral icterus  Breathing comfortably on room air  Abd is soft, not distended, no right upper quadrant tenderness on exam. Area of patients concern right lower quadrant, mild discomfort in that area.  No rebound, no guarding.    No general surgery intervention required at this time  Will sign off, please reconsult as needed

## 2025-05-18 VITALS
RESPIRATION RATE: 18 BRPM | TEMPERATURE: 98 F | DIASTOLIC BLOOD PRESSURE: 80 MMHG | SYSTOLIC BLOOD PRESSURE: 115 MMHG | OXYGEN SATURATION: 98 % | HEART RATE: 62 BPM

## 2025-05-18 LAB
ALBUMIN SERPL ELPH-MCNC: 3.6 G/DL — SIGNIFICANT CHANGE UP (ref 3.3–5)
ALP SERPL-CCNC: 74 U/L — SIGNIFICANT CHANGE UP (ref 40–120)
ALT FLD-CCNC: 16 U/L — SIGNIFICANT CHANGE UP (ref 12–78)
ANION GAP SERPL CALC-SCNC: 8 MMOL/L — SIGNIFICANT CHANGE UP (ref 5–17)
AST SERPL-CCNC: 9 U/L — LOW (ref 15–37)
BILIRUB SERPL-MCNC: 0.7 MG/DL — SIGNIFICANT CHANGE UP (ref 0.2–1.2)
BUN SERPL-MCNC: 12 MG/DL — SIGNIFICANT CHANGE UP (ref 7–23)
CALCIUM SERPL-MCNC: 9.2 MG/DL — SIGNIFICANT CHANGE UP (ref 8.5–10.1)
CHLORIDE SERPL-SCNC: 106 MMOL/L — SIGNIFICANT CHANGE UP (ref 96–108)
CO2 SERPL-SCNC: 22 MMOL/L — SIGNIFICANT CHANGE UP (ref 22–31)
CREAT SERPL-MCNC: 0.93 MG/DL — SIGNIFICANT CHANGE UP (ref 0.5–1.3)
EGFR: 82 ML/MIN/1.73M2 — SIGNIFICANT CHANGE UP
EGFR: 82 ML/MIN/1.73M2 — SIGNIFICANT CHANGE UP
GLUCOSE BLDC GLUCOMTR-MCNC: 89 MG/DL — SIGNIFICANT CHANGE UP (ref 70–99)
GLUCOSE BLDC GLUCOMTR-MCNC: 93 MG/DL — SIGNIFICANT CHANGE UP (ref 70–99)
GLUCOSE SERPL-MCNC: 82 MG/DL — SIGNIFICANT CHANGE UP (ref 70–99)
HCT VFR BLD CALC: 35.9 % — SIGNIFICANT CHANGE UP (ref 34.5–45)
HGB BLD-MCNC: 12.2 G/DL — SIGNIFICANT CHANGE UP (ref 11.5–15.5)
MCHC RBC-ENTMCNC: 28.2 PG — SIGNIFICANT CHANGE UP (ref 27–34)
MCHC RBC-ENTMCNC: 34 G/DL — SIGNIFICANT CHANGE UP (ref 32–36)
MCV RBC AUTO: 82.9 FL — SIGNIFICANT CHANGE UP (ref 80–100)
NRBC BLD AUTO-RTO: 0 /100 WBCS — SIGNIFICANT CHANGE UP (ref 0–0)
PLATELET # BLD AUTO: 191 K/UL — SIGNIFICANT CHANGE UP (ref 150–400)
POTASSIUM SERPL-MCNC: 3.7 MMOL/L — SIGNIFICANT CHANGE UP (ref 3.5–5.3)
POTASSIUM SERPL-SCNC: 3.7 MMOL/L — SIGNIFICANT CHANGE UP (ref 3.5–5.3)
PROT SERPL-MCNC: 8 GM/DL — SIGNIFICANT CHANGE UP (ref 6–8.3)
RBC # BLD: 4.33 M/UL — SIGNIFICANT CHANGE UP (ref 3.8–5.2)
RBC # FLD: 13.4 % — SIGNIFICANT CHANGE UP (ref 10.3–14.5)
SODIUM SERPL-SCNC: 136 MMOL/L — SIGNIFICANT CHANGE UP (ref 135–145)
WBC # BLD: 5.35 K/UL — SIGNIFICANT CHANGE UP (ref 3.8–10.5)
WBC # FLD AUTO: 5.35 K/UL — SIGNIFICANT CHANGE UP (ref 3.8–10.5)

## 2025-05-18 PROCEDURE — 99239 HOSP IP/OBS DSCHRG MGMT >30: CPT

## 2025-05-18 RX ORDER — SEMAGLUTIDE 1 MG/.5ML
0.25 INJECTION, SOLUTION SUBCUTANEOUS
Refills: 0 | DISCHARGE

## 2025-05-18 NOTE — DISCHARGE NOTE PROVIDER - NSDCCPCAREPLAN_GEN_ALL_CORE_FT
PRINCIPAL DISCHARGE DIAGNOSIS  Diagnosis: Abdominal pain  Assessment and Plan of Treatment: Imaging showed gallstones, but no blockages  Follow up with PCP

## 2025-05-18 NOTE — DISCHARGE NOTE NURSING/CASE MANAGEMENT/SOCIAL WORK - FINANCIAL ASSISTANCE
[facility] provides services at a reduced cost to those who are determined to be eligible through [facility]’s financial assistance program. Information regarding [facility]’s financial assistance program can be found by going to [email or web address] or by calling [phone].

## 2025-05-18 NOTE — DISCHARGE NOTE NURSING/CASE MANAGEMENT/SOCIAL WORK - PATIENT PORTAL LINK FT
You can access the FollowMyHealth Patient Portal offered by [facility] by registering at the following website: [email or web address]. By joining [facility]’s FollowMyHealth portal, you will also be able to view your health information using other applications (apps) compatible with our system.

## 2025-05-18 NOTE — DISCHARGE NOTE NURSING/CASE MANAGEMENT/SOCIAL WORK - NSDCPEFALRISK_GEN_ALL_CORE
For information on Fall & Injury Prevention, visit: [email or web address] OR  [email or web address] OR  https://www.cdc.gov/steadi/patient.html

## 2025-05-18 NOTE — DISCHARGE NOTE PROVIDER - HOSPITAL COURSE
Marialuisa Natarajan is a 34-year-old female with a past medical history of Mobitz type I second-degree heart block (status post insertable loop recorder placement) and a history of pericarditis. She presented to the emergency department on 5/16/2025 with intractable nausea, vomiting, and abdominal pain, and was admitted for further management. The patient reported starting Ozempic for weight loss 5 weeks prior, with subsequent decreased appetite, intermittent nausea, and vomiting. Her symptoms worsened after eating turkey chili 3 days before admission, with persistent nausea, bilious emesis, and right upper quadrant/epigastric pain. Workup included a CT abdomen/pelvis without acute findings, and an ultrasound showing cholelithiasis and small right hydronephrosis without cholecystitis. Evaluated by surgery, no indication for inpatient intervention. She received supportive treatment and was started on a clear liquid diet, advanced to regular. . Hypoglycemia from decreased oral intake was treated with intravenous dextrose and improved.  Her elevated blood pressure of 158/110 mmHg on admission, thought to be pain-related, improved with pain treatment. Outpatient follow-up was recommended with her primary care physician to discuss the adverse effects of Ozempic, as well as with cardiology for routine management of her Mobitz type I heart block. Upon discharge, the patient was instructed to monitor for worsening symptoms, maintain hydration, and adhere to medications as needed.     Intractable nausea and vomiting, likely due to adverse effects of Ozempic  Hypoglycemia secondary to decreased oral intake  Elevated blood pressure secondary to pain (resolved)  Mobitz type I second-degree heart block (status post insertable loop recorder placement)  Cholelithiasis    Patient seen and evaluated. DC home.   DC time 37 mins

## 2025-05-23 DIAGNOSIS — R11.2 NAUSEA WITH VOMITING, UNSPECIFIED: ICD-10-CM

## 2025-05-23 DIAGNOSIS — K80.20 CALCULUS OF GALLBLADDER WITHOUT CHOLECYSTITIS WITHOUT OBSTRUCTION: ICD-10-CM

## 2025-05-23 DIAGNOSIS — R03.0 ELEVATED BLOOD-PRESSURE READING, WITHOUT DIAGNOSIS OF HYPERTENSION: ICD-10-CM

## 2025-05-23 DIAGNOSIS — I44.1 ATRIOVENTRICULAR BLOCK, SECOND DEGREE: ICD-10-CM

## 2025-05-23 DIAGNOSIS — N13.30 UNSPECIFIED HYDRONEPHROSIS: ICD-10-CM

## 2025-05-23 DIAGNOSIS — Z95.818 PRESENCE OF OTHER CARDIAC IMPLANTS AND GRAFTS: ICD-10-CM

## 2025-05-23 DIAGNOSIS — R10.11 RIGHT UPPER QUADRANT PAIN: ICD-10-CM

## 2025-05-23 DIAGNOSIS — T50.995A ADVERSE EFFECT OF OTHER DRUGS, MEDICAMENTS AND BIOLOGICAL SUBSTANCES, INITIAL ENCOUNTER: ICD-10-CM

## 2025-05-23 DIAGNOSIS — E16.2 HYPOGLYCEMIA, UNSPECIFIED: ICD-10-CM

## 2025-07-23 NOTE — H&P ADULT. - RESPIRATORY AND THORAX
You have just the right amount of fluid on you.  Please adhere to a low sodium diet (no more than 1.5 grams of sodium in 24h).  3.   Follow fluid restriction of  1. no more than 2 liters in 24 hours..   4. Stop norvasc. BP is too low.  5. If exposed to hot temperatures/outdoor, keep good hydration.  6. Do not take diuretic unless having fluid retention.   details…

## 2025-09-13 ENCOUNTER — EMERGENCY (EMERGENCY)
Facility: HOSPITAL | Age: 35
LOS: 1 days | End: 2025-09-13
Attending: STUDENT IN AN ORGANIZED HEALTH CARE EDUCATION/TRAINING PROGRAM | Admitting: STUDENT IN AN ORGANIZED HEALTH CARE EDUCATION/TRAINING PROGRAM
Payer: MEDICAID

## 2025-09-13 VITALS
RESPIRATION RATE: 16 BRPM | WEIGHT: 235.01 LBS | SYSTOLIC BLOOD PRESSURE: 149 MMHG | HEART RATE: 76 BPM | TEMPERATURE: 98 F | HEIGHT: 68 IN | OXYGEN SATURATION: 100 % | DIASTOLIC BLOOD PRESSURE: 97 MMHG

## 2025-09-13 DIAGNOSIS — Z98.891 HISTORY OF UTERINE SCAR FROM PREVIOUS SURGERY: Chronic | ICD-10-CM

## 2025-09-13 DIAGNOSIS — Z95.818 PRESENCE OF OTHER CARDIAC IMPLANTS AND GRAFTS: Chronic | ICD-10-CM

## 2025-09-13 PROBLEM — Z86.79 PERSONAL HISTORY OF OTHER DISEASES OF THE CIRCULATORY SYSTEM: Chronic | Status: ACTIVE | Noted: 2025-05-16

## 2025-09-13 PROCEDURE — 99284 EMERGENCY DEPT VISIT MOD MDM: CPT

## 2025-09-13 RX ORDER — PREDNISONE 20 MG/1
50 TABLET ORAL ONCE
Refills: 0 | Status: COMPLETED | OUTPATIENT
Start: 2025-09-13 | End: 2025-09-13

## 2025-09-13 RX ORDER — PREDNISONE 20 MG/1
1 TABLET ORAL
Qty: 5 | Refills: 0
Start: 2025-09-13 | End: 2025-09-17

## 2025-09-13 RX ADMIN — Medication 1000 MILLIGRAM(S): at 18:17

## 2025-09-13 RX ADMIN — PREDNISONE 50 MILLIGRAM(S): 20 TABLET ORAL at 18:17
